# Patient Record
Sex: FEMALE | Race: WHITE | NOT HISPANIC OR LATINO
[De-identification: names, ages, dates, MRNs, and addresses within clinical notes are randomized per-mention and may not be internally consistent; named-entity substitution may affect disease eponyms.]

---

## 2017-01-17 ENCOUNTER — RX RENEWAL (OUTPATIENT)
Age: 42
End: 2017-01-17

## 2017-01-19 ENCOUNTER — APPOINTMENT (OUTPATIENT)
Dept: OTOLARYNGOLOGY | Facility: CLINIC | Age: 42
End: 2017-01-19

## 2017-01-19 VITALS
WEIGHT: 130 LBS | DIASTOLIC BLOOD PRESSURE: 81 MMHG | HEIGHT: 60 IN | BODY MASS INDEX: 25.52 KG/M2 | HEART RATE: 81 BPM | SYSTOLIC BLOOD PRESSURE: 120 MMHG

## 2017-01-19 DIAGNOSIS — J34.3 HYPERTROPHY OF NASAL TURBINATES: ICD-10-CM

## 2017-01-26 ENCOUNTER — APPOINTMENT (OUTPATIENT)
Dept: NEUROLOGY | Facility: CLINIC | Age: 42
End: 2017-01-26

## 2017-01-26 ENCOUNTER — APPOINTMENT (OUTPATIENT)
Dept: OTOLARYNGOLOGY | Facility: CLINIC | Age: 42
End: 2017-01-26

## 2017-01-26 VITALS
WEIGHT: 130 LBS | DIASTOLIC BLOOD PRESSURE: 76 MMHG | SYSTOLIC BLOOD PRESSURE: 110 MMHG | HEIGHT: 60 IN | HEART RATE: 84 BPM | BODY MASS INDEX: 25.52 KG/M2

## 2017-01-26 VITALS
WEIGHT: 130 LBS | SYSTOLIC BLOOD PRESSURE: 121 MMHG | BODY MASS INDEX: 25.52 KG/M2 | HEIGHT: 60 IN | HEART RATE: 81 BPM | DIASTOLIC BLOOD PRESSURE: 80 MMHG

## 2017-01-26 DIAGNOSIS — H69.83 OTHER SPECIFIED DISORDERS OF EUSTACHIAN TUBE, BILATERAL: ICD-10-CM

## 2017-01-31 ENCOUNTER — APPOINTMENT (OUTPATIENT)
Dept: OTOLARYNGOLOGY | Facility: CLINIC | Age: 42
End: 2017-01-31

## 2017-01-31 VITALS
DIASTOLIC BLOOD PRESSURE: 68 MMHG | BODY MASS INDEX: 25.52 KG/M2 | HEART RATE: 77 BPM | WEIGHT: 130 LBS | SYSTOLIC BLOOD PRESSURE: 108 MMHG | HEIGHT: 60 IN

## 2017-02-07 ENCOUNTER — APPOINTMENT (OUTPATIENT)
Dept: OTOLARYNGOLOGY | Facility: CLINIC | Age: 42
End: 2017-02-07

## 2017-02-07 VITALS
WEIGHT: 130 LBS | HEIGHT: 60 IN | HEART RATE: 76 BPM | DIASTOLIC BLOOD PRESSURE: 79 MMHG | BODY MASS INDEX: 25.52 KG/M2 | SYSTOLIC BLOOD PRESSURE: 114 MMHG

## 2017-02-07 DIAGNOSIS — J34.2 DEVIATED NASAL SEPTUM: ICD-10-CM

## 2017-02-14 ENCOUNTER — LABORATORY RESULT (OUTPATIENT)
Age: 42
End: 2017-02-14

## 2017-02-14 ENCOUNTER — APPOINTMENT (OUTPATIENT)
Dept: OTOLARYNGOLOGY | Facility: CLINIC | Age: 42
End: 2017-02-14

## 2017-02-14 ENCOUNTER — APPOINTMENT (OUTPATIENT)
Dept: RHEUMATOLOGY | Facility: CLINIC | Age: 42
End: 2017-02-14

## 2017-02-14 VITALS
HEART RATE: 88 BPM | WEIGHT: 129 LBS | SYSTOLIC BLOOD PRESSURE: 130 MMHG | DIASTOLIC BLOOD PRESSURE: 75 MMHG | HEIGHT: 60 IN | BODY MASS INDEX: 25.32 KG/M2

## 2017-02-14 VITALS
WEIGHT: 129 LBS | OXYGEN SATURATION: 97 % | HEART RATE: 80 BPM | BODY MASS INDEX: 25.32 KG/M2 | TEMPERATURE: 97.9 F | SYSTOLIC BLOOD PRESSURE: 120 MMHG | DIASTOLIC BLOOD PRESSURE: 77 MMHG | HEIGHT: 60 IN

## 2017-02-14 DIAGNOSIS — H90.5 UNSPECIFIED SENSORINEURAL HEARING LOSS: ICD-10-CM

## 2017-02-14 DIAGNOSIS — H93.13 TINNITUS, BILATERAL: ICD-10-CM

## 2017-02-14 DIAGNOSIS — R76.8 OTHER SPECIFIED ABNORMAL IMMUNOLOGICAL FINDINGS IN SERUM: ICD-10-CM

## 2017-02-14 DIAGNOSIS — H91.91 UNSPECIFIED HEARING LOSS, RIGHT EAR: ICD-10-CM

## 2017-02-21 ENCOUNTER — APPOINTMENT (OUTPATIENT)
Dept: NEUROLOGY | Facility: CLINIC | Age: 42
End: 2017-02-21

## 2017-02-21 VITALS
HEART RATE: 77 BPM | SYSTOLIC BLOOD PRESSURE: 134 MMHG | WEIGHT: 130 LBS | BODY MASS INDEX: 24.55 KG/M2 | DIASTOLIC BLOOD PRESSURE: 87 MMHG | HEIGHT: 61 IN

## 2017-02-22 LAB
25(OH)D3 SERPL-MCNC: 14.6 NG/ML
ALBUMIN SERPL ELPH-MCNC: 4.9 G/DL
ALP BLD-CCNC: 75 U/L
ALT SERPL-CCNC: 89 U/L
ANA SER IF-ACNC: NEGATIVE
ANION GAP SERPL CALC-SCNC: 18 MMOL/L
APPEARANCE: ABNORMAL
APTT BLD: 26.3 SEC
AST SERPL-CCNC: 122 U/L
B2 GLYCOPROT1 AB SER QL: NEGATIVE
BACTERIA: ABNORMAL
BASOPHILS # BLD AUTO: 0.04 K/UL
BASOPHILS NFR BLD AUTO: 0.6 %
BILIRUB SERPL-MCNC: 0.4 MG/DL
BILIRUBIN URINE: NEGATIVE
BLOOD URINE: NEGATIVE
BUN SERPL-MCNC: 7 MG/DL
CALCIUM SERPL-MCNC: 9.3 MG/DL
CARDIOLIPIN AB SER IA-ACNC: NEGATIVE
CCP AB SER IA-ACNC: <8 UNITS
CENTROMERE IGG SER-ACNC: <0.2 AL
CHLORIDE SERPL-SCNC: 100 MMOL/L
CO2 SERPL-SCNC: 24 MMOL/L
COLOR: ABNORMAL
CREAT SERPL-MCNC: 0.69 MG/DL
CREAT SPEC-SCNC: 301 MG/DL
CREAT/PROT UR: 0.1 RATIO
CRP SERPL-MCNC: 0.24 MG/DL
DSDNA AB SER-ACNC: <12 IU/ML
ENA RNP AB SER IA-ACNC: <0.2 AL
ENA SCL70 IGG SER IA-ACNC: <0.2 AL
ENA SM AB SER IA-ACNC: <0.2 AL
ENA SS-A AB SER IA-ACNC: <0.2 AL
ENA SS-B AB SER IA-ACNC: <0.2 AL
EOSINOPHIL # BLD AUTO: 0.07 K/UL
EOSINOPHIL NFR BLD AUTO: 1.1 %
ERYTHROCYTE [SEDIMENTATION RATE] IN BLOOD BY WESTERGREN METHOD: 11 MM/HR
FOLATE SERPL-MCNC: 8.3 NG/ML
G6PD SER-CCNC: 7.7 U/G HB
GGT SERPL-CCNC: 392 U/L
GLUCOSE QUALITATIVE U: NORMAL MG/DL
GLUCOSE SERPL-MCNC: 92 MG/DL
HCT VFR BLD CALC: 36.9 %
HGB BLD-MCNC: 12.5 G/DL
HYALINE CASTS: 0 /LPF
IMM GRANULOCYTES NFR BLD AUTO: 0.2 %
KETONES URINE: NEGATIVE
LEUKOCYTE ESTERASE URINE: NEGATIVE
LYMPHOCYTES # BLD AUTO: 1.82 K/UL
LYMPHOCYTES NFR BLD AUTO: 28.1 %
MAN DIFF?: NORMAL
MCHC RBC-ENTMCNC: 33.7 PG
MCHC RBC-ENTMCNC: 33.9 GM/DL
MCV RBC AUTO: 99.5 FL
MICROSCOPIC-UA: NORMAL
MONOCYTES # BLD AUTO: 0.68 K/UL
MONOCYTES NFR BLD AUTO: 10.5 %
MPO AB + PR3 PNL SER: NORMAL
NEUTROPHILS # BLD AUTO: 3.86 K/UL
NEUTROPHILS NFR BLD AUTO: 59.5 %
NITRITE URINE: NEGATIVE
PH URINE: 5.5
PLATELET # BLD AUTO: 207 K/UL
POTASSIUM SERPL-SCNC: 4.1 MMOL/L
PROT SERPL-MCNC: 7.6 G/DL
PROT UR-MCNC: 29 MG/DL
PROTEIN URINE: ABNORMAL MG/DL
RBC # BLD: 3.71 M/UL
RBC # FLD: 14.3 %
RED BLOOD CELLS URINE: 7 /HPF
RF+CCP IGG SER-IMP: NEGATIVE
RHEUMATOID FACT SER QL: <7 IU/ML
RNA POLYMERASE III IGG: <10 U
SODIUM SERPL-SCNC: 142 MMOL/L
SPECIFIC GRAVITY URINE: 1.02
SQUAMOUS EPITHELIAL CELLS: 9 /HPF
THYROGLOB AB SERPL-ACNC: <20 IU/ML
THYROPEROXIDASE AB SERPL IA-ACNC: <10 IU/ML
TSH SERPL-ACNC: 0.82 UIU/ML
URATE SERPL-MCNC: 5.4 MG/DL
URINE COMMENTS: NORMAL
UROBILINOGEN URINE: NORMAL MG/DL
VIT B12 SERPL-MCNC: 1290 PG/ML
VIT C SERPL-MCNC: 0.9 MG/DL
WBC # FLD AUTO: 6.48 K/UL
WHITE BLOOD CELLS URINE: 2 /HPF

## 2017-02-28 ENCOUNTER — APPOINTMENT (OUTPATIENT)
Dept: NEUROLOGY | Facility: CLINIC | Age: 42
End: 2017-02-28

## 2017-02-28 VITALS
HEIGHT: 61 IN | WEIGHT: 130 LBS | SYSTOLIC BLOOD PRESSURE: 106 MMHG | DIASTOLIC BLOOD PRESSURE: 74 MMHG | BODY MASS INDEX: 24.55 KG/M2 | HEART RATE: 86 BPM

## 2017-02-28 LAB — AQP4 H2O CHANNEL AB SERPL IA-ACNC: NEGATIVE

## 2017-02-28 RX ORDER — HYDROCORTISONE AND ACETIC ACID OTIC 20.75; 10.375 MG/ML; MG/ML
1-2 SOLUTION AURICULAR (OTIC)
Qty: 1 | Refills: 0 | Status: DISCONTINUED | COMMUNITY
Start: 2017-01-31 | End: 2017-02-28

## 2017-03-02 ENCOUNTER — APPOINTMENT (OUTPATIENT)
Dept: RHEUMATOLOGY | Facility: CLINIC | Age: 42
End: 2017-03-02

## 2017-03-06 ENCOUNTER — APPOINTMENT (OUTPATIENT)
Dept: OTOLARYNGOLOGY | Facility: CLINIC | Age: 42
End: 2017-03-06

## 2017-03-07 ENCOUNTER — APPOINTMENT (OUTPATIENT)
Dept: DERMATOLOGY | Facility: CLINIC | Age: 42
End: 2017-03-07

## 2017-03-28 ENCOUNTER — APPOINTMENT (OUTPATIENT)
Dept: NEUROLOGY | Facility: CLINIC | Age: 42
End: 2017-03-28

## 2017-03-28 ENCOUNTER — RX RENEWAL (OUTPATIENT)
Age: 42
End: 2017-03-28

## 2017-04-25 ENCOUNTER — CLINICAL ADVICE (OUTPATIENT)
Age: 42
End: 2017-04-25

## 2017-05-02 ENCOUNTER — APPOINTMENT (OUTPATIENT)
Dept: ULTRASOUND IMAGING | Facility: CLINIC | Age: 42
End: 2017-05-02

## 2017-05-02 ENCOUNTER — APPOINTMENT (OUTPATIENT)
Dept: DERMATOLOGY | Facility: CLINIC | Age: 42
End: 2017-05-02

## 2017-05-05 ENCOUNTER — APPOINTMENT (OUTPATIENT)
Dept: ULTRASOUND IMAGING | Facility: CLINIC | Age: 42
End: 2017-05-05

## 2017-05-23 ENCOUNTER — APPOINTMENT (OUTPATIENT)
Dept: INTERNAL MEDICINE | Facility: CLINIC | Age: 42
End: 2017-05-23

## 2017-05-24 ENCOUNTER — APPOINTMENT (OUTPATIENT)
Dept: ULTRASOUND IMAGING | Facility: CLINIC | Age: 42
End: 2017-05-24

## 2017-05-24 ENCOUNTER — OUTPATIENT (OUTPATIENT)
Dept: OUTPATIENT SERVICES | Facility: HOSPITAL | Age: 42
LOS: 1 days | End: 2017-05-24
Payer: MEDICAID

## 2017-05-24 DIAGNOSIS — Z98.89 OTHER SPECIFIED POSTPROCEDURAL STATES: Chronic | ICD-10-CM

## 2017-05-24 DIAGNOSIS — R74.0 NONSPECIFIC ELEVATION OF LEVELS OF TRANSAMINASE AND LACTIC ACID DEHYDROGENASE [LDH]: ICD-10-CM

## 2017-05-24 PROCEDURE — 76705 ECHO EXAM OF ABDOMEN: CPT

## 2017-05-26 ENCOUNTER — APPOINTMENT (OUTPATIENT)
Dept: DERMATOLOGY | Facility: CLINIC | Age: 42
End: 2017-05-26

## 2017-05-26 VITALS — SYSTOLIC BLOOD PRESSURE: 108 MMHG | DIASTOLIC BLOOD PRESSURE: 80 MMHG

## 2017-05-26 DIAGNOSIS — H91.90 UNSPECIFIED HEARING LOSS, UNSPECIFIED EAR: ICD-10-CM

## 2017-05-26 DIAGNOSIS — Z86.39 PERSONAL HISTORY OF OTHER ENDOCRINE, NUTRITIONAL AND METABOLIC DISEASE: ICD-10-CM

## 2017-05-26 LAB
ADJUSTED MITOGEN: 1.02 IU/ML
ADJUSTED TB AG: 0 IU/ML
ALBUMIN SERPL ELPH-MCNC: 4.8 G/DL
ALP BLD-CCNC: 79 U/L
ALT SERPL-CCNC: 101 U/L
ANION GAP SERPL CALC-SCNC: 24 MMOL/L
AST SERPL-CCNC: 275 U/L
BASOPHILS # BLD AUTO: 0.07 K/UL
BASOPHILS NFR BLD AUTO: 1 %
BILIRUB SERPL-MCNC: 0.8 MG/DL
BUN SERPL-MCNC: 5 MG/DL
CALCIUM SERPL-MCNC: 9.4 MG/DL
CHLORIDE SERPL-SCNC: 96 MMOL/L
CO2 SERPL-SCNC: 18 MMOL/L
CREAT SERPL-MCNC: 0.62 MG/DL
EOSINOPHIL # BLD AUTO: 0.07 K/UL
EOSINOPHIL NFR BLD AUTO: 1 %
GLUCOSE SERPL-MCNC: 87 MG/DL
HCT VFR BLD CALC: 36.4 %
HGB BLD-MCNC: 11.9 G/DL
IMM GRANULOCYTES NFR BLD AUTO: 0.3 %
LYMPHOCYTES # BLD AUTO: 2.01 K/UL
LYMPHOCYTES NFR BLD AUTO: 27.6 %
M TB IFN-G BLD-IMP: NEGATIVE
MAN DIFF?: NORMAL
MCHC RBC-ENTMCNC: 32.6 PG
MCHC RBC-ENTMCNC: 32.7 GM/DL
MCV RBC AUTO: 99.7 FL
MONOCYTES # BLD AUTO: 0.69 K/UL
MONOCYTES NFR BLD AUTO: 9.5 %
NEUTROPHILS # BLD AUTO: 4.43 K/UL
NEUTROPHILS NFR BLD AUTO: 60.6 %
PLATELET # BLD AUTO: 285 K/UL
POTASSIUM SERPL-SCNC: 4.4 MMOL/L
PROT SERPL-MCNC: 8 G/DL
QUANTIFERON GOLD NIL: 0.02 IU/ML
RBC # BLD: 3.65 M/UL
RBC # FLD: 13.1 %
SODIUM SERPL-SCNC: 138 MMOL/L
WBC # FLD AUTO: 7.29 K/UL

## 2017-06-13 ENCOUNTER — APPOINTMENT (OUTPATIENT)
Dept: DERMATOLOGY | Facility: CLINIC | Age: 42
End: 2017-06-13
Payer: MEDICAID

## 2017-06-13 VITALS — DIASTOLIC BLOOD PRESSURE: 80 MMHG | SYSTOLIC BLOOD PRESSURE: 116 MMHG

## 2017-06-13 PROCEDURE — 99213 OFFICE O/P EST LOW 20 MIN: CPT

## 2017-06-27 ENCOUNTER — APPOINTMENT (OUTPATIENT)
Dept: INTERNAL MEDICINE | Facility: CLINIC | Age: 42
End: 2017-06-27

## 2017-06-27 ENCOUNTER — NON-APPOINTMENT (OUTPATIENT)
Age: 42
End: 2017-06-27

## 2017-06-27 VITALS — DIASTOLIC BLOOD PRESSURE: 80 MMHG | SYSTOLIC BLOOD PRESSURE: 120 MMHG

## 2017-06-27 VITALS — HEIGHT: 55 IN | WEIGHT: 124 LBS | BODY MASS INDEX: 28.7 KG/M2

## 2017-06-27 DIAGNOSIS — H65.93 UNSPECIFIED NONSUPPURATIVE OTITIS MEDIA, BILATERAL: ICD-10-CM

## 2017-06-27 DIAGNOSIS — Z51.81 ENCOUNTER FOR THERAPEUTIC DRUG LVL MONITORING: ICD-10-CM

## 2017-06-27 DIAGNOSIS — Z87.898 PERSONAL HISTORY OF OTHER SPECIFIED CONDITIONS: ICD-10-CM

## 2017-06-27 DIAGNOSIS — H92.03 OTALGIA, BILATERAL: ICD-10-CM

## 2017-06-27 DIAGNOSIS — R20.0 ANESTHESIA OF SKIN: ICD-10-CM

## 2017-06-27 DIAGNOSIS — R29.898 OTHER SYMPTOMS AND SIGNS INVOLVING THE MUSCULOSKELETAL SYSTEM: ICD-10-CM

## 2017-06-27 DIAGNOSIS — R74.0 NONSPECIFIC ELEVATION OF LEVELS OF TRANSAMINASE AND LACTIC ACID DEHYDROGENASE [LDH]: ICD-10-CM

## 2017-06-27 DIAGNOSIS — R04.0 EPISTAXIS: ICD-10-CM

## 2017-06-27 DIAGNOSIS — Z00.00 ENCOUNTER FOR GENERAL ADULT MEDICAL EXAMINATION W/OUT ABNORMAL FINDINGS: ICD-10-CM

## 2017-06-27 RX ORDER — CLONAZEPAM 0.5 MG/1
0.5 TABLET ORAL DAILY
Qty: 30 | Refills: 3 | Status: DISCONTINUED | COMMUNITY
Start: 2017-02-28 | End: 2017-06-27

## 2017-06-28 ENCOUNTER — RESULT REVIEW (OUTPATIENT)
Age: 42
End: 2017-06-28

## 2017-06-28 LAB
25(OH)D3 SERPL-MCNC: 42.4 NG/ML
APPEARANCE: ABNORMAL
BACTERIA: ABNORMAL
BILIRUBIN URINE: NEGATIVE
BLOOD URINE: NEGATIVE
COLOR: ABNORMAL
GLUCOSE QUALITATIVE U: NORMAL MG/DL
HBA1C MFR BLD HPLC: 4.8 %
HIV1+2 AB SPEC QL IA.RAPID: NONREACTIVE
HYALINE CASTS: 0 /LPF
KETONES URINE: ABNORMAL
LEUKOCYTE ESTERASE URINE: NEGATIVE
MICROSCOPIC-UA: NORMAL
NITRITE URINE: NEGATIVE
PH URINE: 6.5
PROTEIN URINE: ABNORMAL MG/DL
RED BLOOD CELLS URINE: 3 /HPF
SPECIFIC GRAVITY URINE: 1.02
SQUAMOUS EPITHELIAL CELLS: 5 /HPF
TSH SERPL-ACNC: 1.61 UIU/ML
UROBILINOGEN URINE: 1 MG/DL
WHITE BLOOD CELLS URINE: 3 /HPF

## 2017-07-11 ENCOUNTER — RX RENEWAL (OUTPATIENT)
Age: 42
End: 2017-07-11

## 2017-07-11 DIAGNOSIS — R23.8 OTHER SKIN CHANGES: ICD-10-CM

## 2017-07-11 LAB
ALBUMIN SERPL ELPH-MCNC: 5 G/DL
ALP BLD-CCNC: 81 U/L
ALT SERPL-CCNC: 72 U/L
ANION GAP SERPL CALC-SCNC: 21 MMOL/L
AST SERPL-CCNC: 174 U/L
BASOPHILS # BLD AUTO: 0.05 K/UL
BASOPHILS NFR BLD AUTO: 1.3 %
BILIRUB SERPL-MCNC: 0.6 MG/DL
BUN SERPL-MCNC: 4 MG/DL
CALCIUM SERPL-MCNC: 9.9 MG/DL
CHLORIDE SERPL-SCNC: 97 MMOL/L
CHOLEST SERPL-MCNC: 198 MG/DL
CHOLEST/HDLC SERPL: 1.7 RATIO
CO2 SERPL-SCNC: 22 MMOL/L
CREAT SERPL-MCNC: 0.71 MG/DL
EOSINOPHIL # BLD AUTO: 0.08 K/UL
EOSINOPHIL NFR BLD AUTO: 2.1 %
GLUCOSE SERPL-MCNC: 105 MG/DL
HCT VFR BLD CALC: 34.4 %
HDLC SERPL-MCNC: 116 MG/DL
HGB BLD-MCNC: 11 G/DL
IMM GRANULOCYTES NFR BLD AUTO: 0.3 %
IRON SATN MFR SERPL: 15 %
IRON SERPL-MCNC: 68 UG/DL
LDLC SERPL CALC-MCNC: 67 MG/DL
LYMPHOCYTES # BLD AUTO: 1.21 K/UL
LYMPHOCYTES NFR BLD AUTO: 31.6 %
MAN DIFF?: NORMAL
MCHC RBC-ENTMCNC: 31.4 PG
MCHC RBC-ENTMCNC: 32 GM/DL
MCV RBC AUTO: 98.3 FL
MONOCYTES # BLD AUTO: 0.56 K/UL
MONOCYTES NFR BLD AUTO: 14.6 %
NEUTROPHILS # BLD AUTO: 1.92 K/UL
NEUTROPHILS NFR BLD AUTO: 50.1 %
PLATELET # BLD AUTO: 237 K/UL
POTASSIUM SERPL-SCNC: 4.3 MMOL/L
PROT SERPL-MCNC: 7.8 G/DL
RBC # BLD: 3.5 M/UL
RBC # FLD: 13 %
SODIUM SERPL-SCNC: 140 MMOL/L
TIBC SERPL-MCNC: 455 UG/DL
TRIGL SERPL-MCNC: 75 MG/DL
UIBC SERPL-MCNC: 387 UG/DL
WBC # FLD AUTO: 3.83 K/UL

## 2017-07-19 ENCOUNTER — APPOINTMENT (OUTPATIENT)
Dept: INTERNAL MEDICINE | Facility: CLINIC | Age: 42
End: 2017-07-19

## 2017-07-19 DIAGNOSIS — Z23 ENCOUNTER FOR IMMUNIZATION: ICD-10-CM

## 2017-07-21 ENCOUNTER — OUTPATIENT (OUTPATIENT)
Dept: OUTPATIENT SERVICES | Facility: HOSPITAL | Age: 42
LOS: 1 days | Discharge: STILL ADMITTED OR ADMINISTRATIVE | End: 2017-07-21
Payer: MEDICAID

## 2017-07-21 DIAGNOSIS — Z98.89 OTHER SPECIFIED POSTPROCEDURAL STATES: Chronic | ICD-10-CM

## 2017-07-21 PROCEDURE — 90792 PSYCH DIAG EVAL W/MED SRVCS: CPT

## 2017-07-24 DIAGNOSIS — F43.23 ADJUSTMENT DISORDER WITH MIXED ANXIETY AND DEPRESSED MOOD: ICD-10-CM

## 2017-07-27 ENCOUNTER — LABORATORY RESULT (OUTPATIENT)
Age: 42
End: 2017-07-27

## 2017-07-27 ENCOUNTER — APPOINTMENT (OUTPATIENT)
Age: 42
End: 2017-07-27
Payer: MEDICAID

## 2017-07-27 VITALS
SYSTOLIC BLOOD PRESSURE: 119 MMHG | DIASTOLIC BLOOD PRESSURE: 79 MMHG | TEMPERATURE: 98.7 F | RESPIRATION RATE: 12 BRPM | WEIGHT: 128 LBS | HEART RATE: 82 BPM | BODY MASS INDEX: 23.55 KG/M2 | HEIGHT: 62 IN

## 2017-07-27 DIAGNOSIS — K76.0 FATTY (CHANGE OF) LIVER, NOT ELSEWHERE CLASSIFIED: ICD-10-CM

## 2017-07-27 PROCEDURE — 99204 OFFICE O/P NEW MOD 45 MIN: CPT

## 2017-07-28 LAB
ALBUMIN SERPL ELPH-MCNC: 4.7 G/DL
ALP BLD-CCNC: 76 U/L
ALT SERPL-CCNC: 86 U/L
ANION GAP SERPL CALC-SCNC: 20 MMOL/L
AST SERPL-CCNC: 225 U/L
BASOPHILS # BLD AUTO: 0.04 K/UL
BASOPHILS NFR BLD AUTO: 0.5 %
BILIRUB SERPL-MCNC: 0.5 MG/DL
BUN SERPL-MCNC: 7 MG/DL
CALCIUM SERPL-MCNC: 10.3 MG/DL
CERULOPLASMIN SERPL-MCNC: 37 MG/DL
CHLORIDE SERPL-SCNC: 96 MMOL/L
CO2 SERPL-SCNC: 22 MMOL/L
CREAT SERPL-MCNC: 0.84 MG/DL
DEPRECATED KAPPA LC FREE/LAMBDA SER: 0.78 RATIO
EOSINOPHIL # BLD AUTO: 0.08 K/UL
EOSINOPHIL NFR BLD AUTO: 1 %
GLUCOSE SERPL-MCNC: 91 MG/DL
HCT VFR BLD CALC: 34.2 %
HGB BLD-MCNC: 10.9 G/DL
IGA SER QL IEP: 290 MG/DL
IGG SER QL IEP: 954 MG/DL
IGM SER QL IEP: 175 MG/DL
IMM GRANULOCYTES NFR BLD AUTO: 0.2 %
KAPPA LC CSF-MCNC: 1.95 MG/DL
KAPPA LC SERPL-MCNC: 1.53 MG/DL
LYMPHOCYTES # BLD AUTO: 2.14 K/UL
LYMPHOCYTES NFR BLD AUTO: 26.3 %
MAN DIFF?: NORMAL
MCHC RBC-ENTMCNC: 29.3 PG
MCHC RBC-ENTMCNC: 31.9 GM/DL
MCV RBC AUTO: 91.9 FL
MONOCYTES # BLD AUTO: 0.9 K/UL
MONOCYTES NFR BLD AUTO: 11.1 %
MPO AB + PR3 PNL SER: NORMAL
NEUTROPHILS # BLD AUTO: 4.95 K/UL
NEUTROPHILS NFR BLD AUTO: 60.9 %
PLATELET # BLD AUTO: 299 K/UL
POTASSIUM SERPL-SCNC: 3.9 MMOL/L
PROT SERPL-MCNC: 8.3 G/DL
RBC # BLD: 3.72 M/UL
RBC # FLD: 14.1 %
SODIUM SERPL-SCNC: 138 MMOL/L
TTG IGA SER IA-ACNC: 6.9 UNITS
TTG IGA SER-ACNC: NEGATIVE
WBC # FLD AUTO: 8.13 K/UL

## 2017-07-29 LAB
CARDIOLIPIN AB SER IA-ACNC: POSITIVE
MITOCHONDRIA AB SER IF-ACNC: NORMAL
SMOOTH MUSCLE AB SER QL IF: NORMAL

## 2017-07-31 ENCOUNTER — RX RENEWAL (OUTPATIENT)
Age: 42
End: 2017-07-31

## 2017-07-31 LAB
ANA PAT FLD IF-IMP: ABNORMAL
ANA PATTERN: ABNORMAL
ANA SER IF-ACNC: ABNORMAL
ANA TITER: ABNORMAL
HBV DNA # SERPL NAA+PROBE: NOT DETECTED
HEPB DNA PCR LOG: NOT DETECTED LOGIU/ML

## 2017-08-04 LAB — HEPATITIS E IGM ABY: NORMAL

## 2017-08-10 ENCOUNTER — APPOINTMENT (OUTPATIENT)
Dept: INTERNAL MEDICINE | Facility: CLINIC | Age: 42
End: 2017-08-10
Payer: MEDICAID

## 2017-08-10 LAB
MISCELLANEOUS TEST: NORMAL
PROC NAME: NORMAL

## 2017-08-10 PROCEDURE — 90471 IMMUNIZATION ADMIN: CPT

## 2017-08-10 PROCEDURE — 90636 HEP A/HEP B VACC ADULT IM: CPT

## 2017-08-14 ENCOUNTER — APPOINTMENT (OUTPATIENT)
Age: 42
End: 2017-08-14
Payer: MEDICAID

## 2017-08-14 LAB
INR PPP: 0.97 RATIO
PT BLD: 11 SEC

## 2017-08-14 PROCEDURE — 91200 LIVER ELASTOGRAPHY: CPT

## 2017-08-16 LAB
ALBUMIN SERPL ELPH-MCNC: 4.9 G/DL
ALP BLD-CCNC: 80 U/L
ALT SERPL-CCNC: 105 U/L
APTT IMM NP/PRE NP PPP: NORMAL
APTT INV RATIO PPP: 27.7 SEC
AST SERPL-CCNC: 263 U/L
BASOPHILS # BLD AUTO: 0.08 K/UL
BASOPHILS NFR BLD AUTO: 2 %
BILIRUB DIRECT SERPL-MCNC: 0.2 MG/DL
BILIRUB INDIRECT SERPL-MCNC: 0.4 MG/DL
BILIRUB SERPL-MCNC: 0.6 MG/DL
DEPRECATED KAPPA LC FREE/LAMBDA SER: 0.91 RATIO
EOSINOPHIL # BLD AUTO: 0.16 K/UL
EOSINOPHIL NFR BLD AUTO: 4 %
HCT VFR BLD CALC: 34.5 %
HGB BLD-MCNC: 10.8 G/DL
IGA SER QL IEP: 308 MG/DL
IGG SER QL IEP: 1110 MG/DL
IGM SER QL IEP: 181 MG/DL
IMM GRANULOCYTES NFR BLD AUTO: 0 %
KAPPA LC CSF-MCNC: 2.1 MG/DL
KAPPA LC SERPL-MCNC: 1.91 MG/DL
LYMPHOCYTES # BLD AUTO: 1.61 K/UL
LYMPHOCYTES NFR BLD AUTO: 40.3 %
MAN DIFF?: NORMAL
MCHC RBC-ENTMCNC: 28.5 PG
MCHC RBC-ENTMCNC: 31.3 GM/DL
MCV RBC AUTO: 91 FL
MONOCYTES # BLD AUTO: 0.55 K/UL
MONOCYTES NFR BLD AUTO: 13.8 %
NEUTROPHILS # BLD AUTO: 1.6 K/UL
NEUTROPHILS NFR BLD AUTO: 39.9 %
NPP NORMAL POOLED PLASMA: NORMAL
PLATELET # BLD AUTO: 248 K/UL
PROT SERPL-MCNC: 8.3 G/DL
RBC # BLD: 3.79 M/UL
RBC # FLD: 15.3 %
WBC # FLD AUTO: 4 K/UL

## 2017-09-01 ENCOUNTER — APPOINTMENT (OUTPATIENT)
Age: 42
End: 2017-09-01

## 2017-09-03 ENCOUNTER — TRANSCRIPTION ENCOUNTER (OUTPATIENT)
Age: 42
End: 2017-09-03

## 2017-09-29 ENCOUNTER — RX RENEWAL (OUTPATIENT)
Age: 42
End: 2017-09-29

## 2017-10-05 ENCOUNTER — OTHER (OUTPATIENT)
Age: 42
End: 2017-10-05

## 2017-10-06 ENCOUNTER — RX RENEWAL (OUTPATIENT)
Age: 42
End: 2017-10-06

## 2017-11-13 ENCOUNTER — APPOINTMENT (OUTPATIENT)
Dept: INTERNAL MEDICINE | Facility: CLINIC | Age: 42
End: 2017-11-13
Payer: MEDICAID

## 2017-11-13 PROCEDURE — 90746 HEPB VACCINE 3 DOSE ADULT IM: CPT

## 2017-11-13 PROCEDURE — 90472 IMMUNIZATION ADMIN EACH ADD: CPT

## 2017-11-13 PROCEDURE — 90686 IIV4 VACC NO PRSV 0.5 ML IM: CPT

## 2017-11-13 PROCEDURE — G0008: CPT

## 2017-11-13 PROCEDURE — 99214 OFFICE O/P EST MOD 30 MIN: CPT | Mod: 25

## 2017-11-14 VITALS — SYSTOLIC BLOOD PRESSURE: 120 MMHG | DIASTOLIC BLOOD PRESSURE: 80 MMHG

## 2017-11-17 ENCOUNTER — RESULT REVIEW (OUTPATIENT)
Age: 42
End: 2017-11-17

## 2017-11-17 ENCOUNTER — APPOINTMENT (OUTPATIENT)
Dept: DERMATOLOGY | Facility: CLINIC | Age: 42
End: 2017-11-17
Payer: MEDICAID

## 2017-11-17 VITALS — SYSTOLIC BLOOD PRESSURE: 130 MMHG | DIASTOLIC BLOOD PRESSURE: 84 MMHG

## 2017-11-17 PROCEDURE — 99214 OFFICE O/P EST MOD 30 MIN: CPT

## 2017-11-20 ENCOUNTER — TRANSCRIPTION ENCOUNTER (OUTPATIENT)
Age: 42
End: 2017-11-20

## 2017-11-20 ENCOUNTER — RESULT REVIEW (OUTPATIENT)
Age: 42
End: 2017-11-20

## 2017-11-20 LAB
ALBUMIN SERPL ELPH-MCNC: 4.3 G/DL
ALP BLD-CCNC: 152 U/L
ALT SERPL-CCNC: 533 U/L
ANION GAP SERPL CALC-SCNC: 16 MMOL/L
AST SERPL-CCNC: 963 U/L
BASOPHILS # BLD AUTO: 0.03 K/UL
BASOPHILS NFR BLD AUTO: 0.7 %
BILIRUB SERPL-MCNC: 0.6 MG/DL
BUN SERPL-MCNC: 7 MG/DL
CALCIUM SERPL-MCNC: 8.7 MG/DL
CHLORIDE SERPL-SCNC: 96 MMOL/L
CO2 SERPL-SCNC: 25 MMOL/L
CREAT SERPL-MCNC: 0.69 MG/DL
EOSINOPHIL # BLD AUTO: 0 K/UL
EOSINOPHIL NFR BLD AUTO: 0 %
GLUCOSE SERPL-MCNC: 101 MG/DL
HCT VFR BLD CALC: 35.3 %
HGB BLD-MCNC: 11.3 G/DL
IMM GRANULOCYTES NFR BLD AUTO: 0.2 %
LYMPHOCYTES # BLD AUTO: 1.29 K/UL
LYMPHOCYTES NFR BLD AUTO: 29.1 %
MAN DIFF?: NORMAL
MCHC RBC-ENTMCNC: 27 PG
MCHC RBC-ENTMCNC: 32 GM/DL
MCV RBC AUTO: 84.2 FL
MONOCYTES # BLD AUTO: 0.71 K/UL
MONOCYTES NFR BLD AUTO: 16 %
NEUTROPHILS # BLD AUTO: 2.4 K/UL
NEUTROPHILS NFR BLD AUTO: 54 %
PLATELET # BLD AUTO: 269 K/UL
POTASSIUM SERPL-SCNC: 4 MMOL/L
PROT SERPL-MCNC: 8.8 G/DL
RBC # BLD: 4.19 M/UL
RBC # FLD: 16.4 %
SODIUM SERPL-SCNC: 137 MMOL/L
WBC # FLD AUTO: 4.44 K/UL

## 2017-11-22 ENCOUNTER — APPOINTMENT (OUTPATIENT)
Age: 42
End: 2017-11-22
Payer: MEDICAID

## 2017-11-22 VITALS
SYSTOLIC BLOOD PRESSURE: 127 MMHG | HEIGHT: 62 IN | BODY MASS INDEX: 23.19 KG/M2 | RESPIRATION RATE: 17 BRPM | DIASTOLIC BLOOD PRESSURE: 87 MMHG | TEMPERATURE: 98.3 F | WEIGHT: 126 LBS | HEART RATE: 88 BPM

## 2017-11-22 LAB
BASOPHILS # BLD AUTO: 0.04 K/UL
BASOPHILS NFR BLD AUTO: 0.8 %
EOSINOPHIL # BLD AUTO: 0 K/UL
EOSINOPHIL NFR BLD AUTO: 0 %
FERRITIN SERPL-MCNC: 175 NG/ML
HCT VFR BLD CALC: 36.9 %
HGB BLD-MCNC: 11.9 G/DL
IMM GRANULOCYTES NFR BLD AUTO: 0 %
LYMPHOCYTES # BLD AUTO: 1.47 K/UL
LYMPHOCYTES NFR BLD AUTO: 30.4 %
MAN DIFF?: NORMAL
MCHC RBC-ENTMCNC: 27.4 PG
MCHC RBC-ENTMCNC: 32.2 GM/DL
MCV RBC AUTO: 85 FL
MONOCYTES # BLD AUTO: 0.69 K/UL
MONOCYTES NFR BLD AUTO: 14.3 %
NEUTROPHILS # BLD AUTO: 2.64 K/UL
NEUTROPHILS NFR BLD AUTO: 54.5 %
PLATELET # BLD AUTO: 275 K/UL
RBC # BLD: 4.34 M/UL
RBC # FLD: 16.7 %
WBC # FLD AUTO: 4.84 K/UL

## 2017-11-22 PROCEDURE — 99213 OFFICE O/P EST LOW 20 MIN: CPT

## 2017-11-22 RX ORDER — AMOXICILLIN AND CLAVULANATE POTASSIUM 875; 125 MG/1; MG/1
875-125 TABLET, COATED ORAL
Qty: 20 | Refills: 0 | Status: DISCONTINUED | COMMUNITY
Start: 2017-11-13 | End: 2017-11-22

## 2017-11-27 ENCOUNTER — APPOINTMENT (OUTPATIENT)
Dept: INTERNAL MEDICINE | Facility: CLINIC | Age: 42
End: 2017-11-27
Payer: MEDICAID

## 2017-11-27 ENCOUNTER — LABORATORY RESULT (OUTPATIENT)
Age: 42
End: 2017-11-27

## 2017-11-27 LAB
AFP-TM SERPL-MCNC: 4.4 NG/ML
ALBUMIN SERPL ELPH-MCNC: 4.4 G/DL
ALP BLD-CCNC: 143 U/L
ALT SERPL-CCNC: 417 U/L
ANA PAT FLD IF-IMP: ABNORMAL
ANA SER IF-ACNC: ABNORMAL
ANION GAP SERPL CALC-SCNC: 16 MMOL/L
AST SERPL-CCNC: 569 U/L
BILIRUB SERPL-MCNC: 0.9 MG/DL
BUN SERPL-MCNC: 6 MG/DL
CALCIUM SERPL-MCNC: 10 MG/DL
CHLORIDE SERPL-SCNC: 94 MMOL/L
CMV DNA SPEC QL NAA+PROBE: NOT DETECTED
CMV IGG SERPL QL: <0.2 U/ML
CMV IGG SERPL-IMP: NEGATIVE
CMV IGM SERPL QL: <8 AU/ML
CMV IGM SERPL QL: NEGATIVE
CO2 SERPL-SCNC: 23 MMOL/L
CREAT SERPL-MCNC: 0.67 MG/DL
DEPRECATED KAPPA LC FREE/LAMBDA SER: 1.61 RATIO
EBV DNA SERPL NAA+PROBE-ACNC: NOT DETECTED
EBV EA AB SER IA-ACNC: 101 U/ML
EBV EA AB TITR SER IF: NEGATIVE
EBV EA IGG SER QL IA: <3 U/ML
EBV EA IGG SER-ACNC: POSITIVE
EBV EA IGM SER IA-ACNC: NEGATIVE
EBV PATRN SPEC IB-IMP: NORMAL
EBV VCA IGG SER IA-ACNC: 128 U/ML
EBV VCA IGM SER QL IA: <10 U/ML
EPSTEIN-BARR VIRUS CAPSID ANTIGEN IGG: POSITIVE
GLUCOSE SERPL-MCNC: 99 MG/DL
HAV IGG+IGM SER QL: REACTIVE
HAV IGM SER QL: NONREACTIVE
HBV CORE IGM SER QL: NONREACTIVE
HBV DNA # SERPL NAA+PROBE: NOT DETECTED IU/ML
HBV E AB SER QL: NEGATIVE
HBV E AG SER QL: NEGATIVE
HBV SURFACE AB SER QL: REACTIVE
HBV SURFACE AG SER QL: NONREACTIVE
HCV AB SER QL: NONREACTIVE
HCV RNA SERPL NAA DL=5-ACNC: NOT DETECTED IU/ML
HCV RNA SERPL NAA+PROBE-LOG IU: NOT DETECTED LOGIU/ML
HCV S/CO RATIO: 0.1 S/CO
HEPATITIS E IGM ABY: NORMAL
HEPB DNA PCR LOG: NOT DETECTED LOGIU/ML
HEV AB SER QL: NEGATIVE
IGA SER QL IEP: 393 MG/DL
IGG SER QL IEP: 2310 MG/DL
IGM SER QL IEP: 243 MG/DL
IRON SATN MFR SERPL: 13 %
IRON SERPL-MCNC: 80 UG/DL
KAPPA LC CSF-MCNC: 3.37 MG/DL
KAPPA LC SERPL-MCNC: 5.41 MG/DL
MITOCHONDRIA AB SER IF-ACNC: NORMAL
POTASSIUM SERPL-SCNC: 3.7 MMOL/L
PROT SERPL-MCNC: 9.4 G/DL
SMOOTH MUSCLE AB SER QL IF: ABNORMAL
SODIUM SERPL-SCNC: 133 MMOL/L
SOLUBLE LIVER IGG SER IA-ACNC: < 20.1 UNITS
TIBC SERPL-MCNC: 596 UG/DL
UIBC SERPL-MCNC: 516 UG/DL

## 2017-11-27 PROCEDURE — 36415 COLL VENOUS BLD VENIPUNCTURE: CPT

## 2017-11-28 LAB
INR PPP: 1.03 RATIO
PT BLD: 11.6 SEC

## 2017-11-29 ENCOUNTER — RX RENEWAL (OUTPATIENT)
Age: 42
End: 2017-11-29

## 2017-11-29 LAB
ALBUMIN SERPL ELPH-MCNC: 3.8 G/DL
ALP BLD-CCNC: 113 U/L
ALT SERPL-CCNC: 447 U/L
ANION GAP SERPL CALC-SCNC: 15 MMOL/L
AST SERPL-CCNC: 463 U/L
BASOPHILS # BLD AUTO: 0.04 K/UL
BASOPHILS NFR BLD AUTO: 0.7 %
BILIRUB SERPL-MCNC: 0.7 MG/DL
BUN SERPL-MCNC: 9 MG/DL
CALCIUM SERPL-MCNC: 9.9 MG/DL
CHLORIDE SERPL-SCNC: 103 MMOL/L
CO2 SERPL-SCNC: 18 MMOL/L
CREAT SERPL-MCNC: 0.85 MG/DL
DEPRECATED KAPPA LC FREE/LAMBDA SER: 1.31 RATIO
EOSINOPHIL # BLD AUTO: 0 K/UL
EOSINOPHIL NFR BLD AUTO: 0
GLUCOSE SERPL-MCNC: 97 MG/DL
HCT VFR BLD CALC: 36.7 %
HGB BLD-MCNC: 11.4 G/DL
IGA SER QL IEP: 369 MG/DL
IGG SER QL IEP: 2180 MG/DL
IGM SER QL IEP: 218 MG/DL
IMM GRANULOCYTES NFR BLD AUTO: 0.2 %
KAPPA LC CSF-MCNC: 3.12 MG/DL
KAPPA LC SERPL-MCNC: 4.1 MG/DL
LYMPHOCYTES # BLD AUTO: 2.06 K/UL
LYMPHOCYTES NFR BLD AUTO: 37.7 %
MAN DIFF?: NORMAL
MCHC RBC-ENTMCNC: 27.9 PG
MCHC RBC-ENTMCNC: 31.1 GM/DL
MCV RBC AUTO: 90 FL
MONOCYTES # BLD AUTO: 0.97 K/UL
MONOCYTES NFR BLD AUTO: 17.8 %
NEUTROPHILS # BLD AUTO: 2.38 K/UL
NEUTROPHILS NFR BLD AUTO: 43.6 %
PLATELET # BLD AUTO: 274 K/UL
POTASSIUM SERPL-SCNC: 4.5 MMOL/L
PROT SERPL-MCNC: 8.7 G/DL
RBC # BLD: 4.08 M/UL
RBC # FLD: 18.5 %
SMOOTH MUSCLE AB SER QL IF: NORMAL
SODIUM SERPL-SCNC: 136 MMOL/L
WBC # FLD AUTO: 5.46 K/UL

## 2017-12-06 ENCOUNTER — APPOINTMENT (OUTPATIENT)
Dept: INTERNAL MEDICINE | Facility: CLINIC | Age: 42
End: 2017-12-06
Payer: MEDICAID

## 2017-12-06 PROCEDURE — 36415 COLL VENOUS BLD VENIPUNCTURE: CPT

## 2017-12-08 ENCOUNTER — RX RENEWAL (OUTPATIENT)
Age: 42
End: 2017-12-08

## 2017-12-08 LAB
ALBUMIN SERPL ELPH-MCNC: 3.7 G/DL
ALP BLD-CCNC: 111 U/L
ALT SERPL-CCNC: 338 U/L
ANION GAP SERPL CALC-SCNC: 11 MMOL/L
AST SERPL-CCNC: 256 U/L
BILIRUB SERPL-MCNC: 0.7 MG/DL
BUN SERPL-MCNC: 8 MG/DL
CALCIUM SERPL-MCNC: 9.4 MG/DL
CHLORIDE SERPL-SCNC: 102 MMOL/L
CO2 SERPL-SCNC: 25 MMOL/L
CREAT SERPL-MCNC: 0.8 MG/DL
GLUCOSE SERPL-MCNC: 81 MG/DL
POTASSIUM SERPL-SCNC: 4.3 MMOL/L
PROT SERPL-MCNC: 8.1 G/DL
SODIUM SERPL-SCNC: 138 MMOL/L

## 2017-12-18 ENCOUNTER — APPOINTMENT (OUTPATIENT)
Dept: INTERNAL MEDICINE | Facility: CLINIC | Age: 42
End: 2017-12-18
Payer: MEDICAID

## 2017-12-18 PROCEDURE — 36415 COLL VENOUS BLD VENIPUNCTURE: CPT

## 2017-12-19 ENCOUNTER — APPOINTMENT (OUTPATIENT)
Dept: MRI IMAGING | Facility: IMAGING CENTER | Age: 42
End: 2017-12-19
Payer: MEDICAID

## 2017-12-19 ENCOUNTER — OUTPATIENT (OUTPATIENT)
Dept: OUTPATIENT SERVICES | Facility: HOSPITAL | Age: 42
LOS: 1 days | End: 2017-12-19
Payer: MEDICAID

## 2017-12-19 DIAGNOSIS — Z98.89 OTHER SPECIFIED POSTPROCEDURAL STATES: Chronic | ICD-10-CM

## 2017-12-19 DIAGNOSIS — R74.8 ABNORMAL LEVELS OF OTHER SERUM ENZYMES: ICD-10-CM

## 2017-12-19 LAB
ALBUMIN SERPL ELPH-MCNC: 4 G/DL
ALP BLD-CCNC: 85 U/L
ALT SERPL-CCNC: 171 U/L
ANION GAP SERPL CALC-SCNC: 20 MMOL/L
AST SERPL-CCNC: 175 U/L
BASOPHILS # BLD AUTO: 0.03 K/UL
BASOPHILS NFR BLD AUTO: 0.4 %
BILIRUB SERPL-MCNC: 0.8 MG/DL
BUN SERPL-MCNC: 13 MG/DL
CALCIUM SERPL-MCNC: 9.7 MG/DL
CHLORIDE SERPL-SCNC: 97 MMOL/L
CO2 SERPL-SCNC: 20 MMOL/L
CREAT SERPL-MCNC: 0.93 MG/DL
DEPRECATED KAPPA LC FREE/LAMBDA SER: 0.97 RATIO
EOSINOPHIL # BLD AUTO: 0.05 K/UL
EOSINOPHIL NFR BLD AUTO: 0.7 %
GLUCOSE SERPL-MCNC: 81 MG/DL
HCT VFR BLD CALC: 35.7 %
HGB BLD-MCNC: 11.2 G/DL
IGA SER QL IEP: 350 MG/DL
IGG SER QL IEP: 2240 MG/DL
IGM SER QL IEP: 218 MG/DL
IMM GRANULOCYTES NFR BLD AUTO: 0.1 %
INR PPP: 0.99 RATIO
KAPPA LC CSF-MCNC: 2.7 MG/DL
KAPPA LC SERPL-MCNC: 2.61 MG/DL
LYMPHOCYTES # BLD AUTO: 2.87 K/UL
LYMPHOCYTES NFR BLD AUTO: 38.4 %
MAN DIFF?: NORMAL
MCHC RBC-ENTMCNC: 27.2 PG
MCHC RBC-ENTMCNC: 31.4 GM/DL
MCV RBC AUTO: 86.7 FL
MONOCYTES # BLD AUTO: 0.75 K/UL
MONOCYTES NFR BLD AUTO: 10 %
NEUTROPHILS # BLD AUTO: 3.77 K/UL
NEUTROPHILS NFR BLD AUTO: 50.4 %
PLATELET # BLD AUTO: 361 K/UL
POTASSIUM SERPL-SCNC: 4 MMOL/L
PROT SERPL-MCNC: 8.7 G/DL
PT BLD: 11.2 SEC
RBC # BLD: 4.12 M/UL
RBC # FLD: 18.1 %
SODIUM SERPL-SCNC: 137 MMOL/L
WBC # FLD AUTO: 7.48 K/UL

## 2017-12-19 PROCEDURE — 74183 MRI ABD W/O CNTR FLWD CNTR: CPT

## 2017-12-19 PROCEDURE — 74183 MRI ABD W/O CNTR FLWD CNTR: CPT | Mod: 26

## 2017-12-19 PROCEDURE — A9585: CPT

## 2017-12-21 ENCOUNTER — OUTPATIENT (OUTPATIENT)
Dept: OUTPATIENT SERVICES | Facility: HOSPITAL | Age: 42
LOS: 1 days | End: 2017-12-21
Payer: MEDICAID

## 2017-12-21 ENCOUNTER — RESULT REVIEW (OUTPATIENT)
Age: 42
End: 2017-12-21

## 2017-12-21 ENCOUNTER — APPOINTMENT (OUTPATIENT)
Dept: ULTRASOUND IMAGING | Facility: IMAGING CENTER | Age: 42
End: 2017-12-21
Payer: MEDICAID

## 2017-12-21 DIAGNOSIS — Z98.89 OTHER SPECIFIED POSTPROCEDURAL STATES: Chronic | ICD-10-CM

## 2017-12-21 DIAGNOSIS — Z00.8 ENCOUNTER FOR OTHER GENERAL EXAMINATION: ICD-10-CM

## 2017-12-21 DIAGNOSIS — R74.8 ABNORMAL LEVELS OF OTHER SERUM ENZYMES: ICD-10-CM

## 2017-12-21 PROCEDURE — 88313 SPECIAL STAINS GROUP 2: CPT | Mod: 26

## 2017-12-21 PROCEDURE — 88313 SPECIAL STAINS GROUP 2: CPT

## 2017-12-21 PROCEDURE — 47000 NEEDLE BIOPSY OF LIVER PERQ: CPT

## 2017-12-21 PROCEDURE — 88307 TISSUE EXAM BY PATHOLOGIST: CPT

## 2017-12-21 PROCEDURE — 76942 ECHO GUIDE FOR BIOPSY: CPT | Mod: 26

## 2017-12-21 PROCEDURE — 88307 TISSUE EXAM BY PATHOLOGIST: CPT | Mod: 26

## 2017-12-21 PROCEDURE — 76942 ECHO GUIDE FOR BIOPSY: CPT

## 2017-12-22 LAB — SURGICAL PATHOLOGY STUDY: SIGNIFICANT CHANGE UP

## 2018-01-08 ENCOUNTER — TRANSCRIPTION ENCOUNTER (OUTPATIENT)
Age: 43
End: 2018-01-08

## 2018-01-09 ENCOUNTER — TRANSCRIPTION ENCOUNTER (OUTPATIENT)
Age: 43
End: 2018-01-09

## 2018-01-09 ENCOUNTER — INPATIENT (INPATIENT)
Facility: HOSPITAL | Age: 43
LOS: 2 days | Discharge: HOME | DRG: 152 | End: 2018-01-12
Attending: OTOLARYNGOLOGY | Admitting: GENERAL ACUTE CARE HOSPITAL
Payer: MEDICAID

## 2018-01-09 VITALS — RESPIRATION RATE: 30 BRPM | HEART RATE: 120 BPM

## 2018-01-09 DIAGNOSIS — J05.10 ACUTE EPIGLOTTITIS WITHOUT OBSTRUCTION: ICD-10-CM

## 2018-01-09 DIAGNOSIS — Z98.89 OTHER SPECIFIED POSTPROCEDURAL STATES: Chronic | ICD-10-CM

## 2018-01-09 DIAGNOSIS — Z98.890 OTHER SPECIFIED POSTPROCEDURAL STATES: Chronic | ICD-10-CM

## 2018-01-09 DIAGNOSIS — J04.30 SUPRAGLOTTITIS, UNSPECIFIED, WITHOUT OBSTRUCTION: ICD-10-CM

## 2018-01-09 LAB
ALBUMIN SERPL ELPH-MCNC: 4.4 G/DL — SIGNIFICANT CHANGE UP (ref 3.3–5)
ALP SERPL-CCNC: 78 U/L — SIGNIFICANT CHANGE UP (ref 40–120)
ALT FLD-CCNC: 67 U/L RC — HIGH (ref 10–45)
ANION GAP SERPL CALC-SCNC: 18 MMOL/L — HIGH (ref 5–17)
AST SERPL-CCNC: 132 U/L — HIGH (ref 10–40)
BASE EXCESS BLDV CALC-SCNC: 2.3 MMOL/L — HIGH (ref -2–2)
BASOPHILS # BLD AUTO: 0 K/UL — SIGNIFICANT CHANGE UP (ref 0–0.2)
BASOPHILS NFR BLD AUTO: 0.7 % — SIGNIFICANT CHANGE UP (ref 0–2)
BILIRUB SERPL-MCNC: 0.3 MG/DL — SIGNIFICANT CHANGE UP (ref 0.2–1.2)
BUN SERPL-MCNC: 6 MG/DL — LOW (ref 7–23)
CA-I SERPL-SCNC: 1 MMOL/L — LOW (ref 1.12–1.3)
CALCIUM SERPL-MCNC: 8.6 MG/DL — SIGNIFICANT CHANGE UP (ref 8.4–10.5)
CHLORIDE BLDV-SCNC: 109 MMOL/L — HIGH (ref 96–108)
CHLORIDE SERPL-SCNC: 100 MMOL/L — SIGNIFICANT CHANGE UP (ref 96–108)
CO2 BLDV-SCNC: 29 MMOL/L — SIGNIFICANT CHANGE UP (ref 22–30)
CO2 SERPL-SCNC: 23 MMOL/L — SIGNIFICANT CHANGE UP (ref 22–31)
CREAT SERPL-MCNC: 0.59 MG/DL — SIGNIFICANT CHANGE UP (ref 0.5–1.3)
EOSINOPHIL # BLD AUTO: 0 K/UL — SIGNIFICANT CHANGE UP (ref 0–0.5)
EOSINOPHIL NFR BLD AUTO: 0.8 % — SIGNIFICANT CHANGE UP (ref 0–6)
FLUAV H1 2009 PAND RNA SPEC QL NAA+PROBE: DETECTED
GAS PNL BLDV: 141 MMOL/L — SIGNIFICANT CHANGE UP (ref 136–145)
GAS PNL BLDV: SIGNIFICANT CHANGE UP
GAS PNL BLDV: SIGNIFICANT CHANGE UP
GLUCOSE BLDC GLUCOMTR-MCNC: 150 MG/DL — HIGH (ref 70–99)
GLUCOSE BLDV-MCNC: 102 MG/DL — HIGH (ref 70–99)
GLUCOSE SERPL-MCNC: 104 MG/DL — HIGH (ref 70–99)
HCG SERPL-ACNC: <2 MIU/ML — LOW (ref 5–24)
HCO3 BLDV-SCNC: 27 MMOL/L — SIGNIFICANT CHANGE UP (ref 21–29)
HCT VFR BLD CALC: 36.4 % — SIGNIFICANT CHANGE UP (ref 34.5–45)
HCT VFR BLDA CALC: 36 % — LOW (ref 39–50)
HGB BLD CALC-MCNC: 11.7 G/DL — SIGNIFICANT CHANGE UP (ref 11.5–15.5)
HGB BLD-MCNC: 11.6 G/DL — SIGNIFICANT CHANGE UP (ref 11.5–15.5)
LACTATE BLDV-MCNC: 4.6 MMOL/L — CRITICAL HIGH (ref 0.7–2)
LYMPHOCYTES # BLD AUTO: 2.7 K/UL — SIGNIFICANT CHANGE UP (ref 1–3.3)
LYMPHOCYTES # BLD AUTO: 62.4 % — HIGH (ref 13–44)
MCHC RBC-ENTMCNC: 27.9 PG — SIGNIFICANT CHANGE UP (ref 27–34)
MCHC RBC-ENTMCNC: 31.8 GM/DL — LOW (ref 32–36)
MCV RBC AUTO: 88 FL — SIGNIFICANT CHANGE UP (ref 80–100)
MONOCYTES # BLD AUTO: 0.6 K/UL — SIGNIFICANT CHANGE UP (ref 0–0.9)
MONOCYTES NFR BLD AUTO: 12.9 % — SIGNIFICANT CHANGE UP (ref 2–14)
NEUTROPHILS # BLD AUTO: 1 K/UL — LOW (ref 1.8–7.4)
NEUTROPHILS NFR BLD AUTO: 23.1 % — LOW (ref 43–77)
PCO2 BLDV: 46 MMHG — SIGNIFICANT CHANGE UP (ref 35–50)
PH BLDV: 7.39 — SIGNIFICANT CHANGE UP (ref 7.35–7.45)
PLATELET # BLD AUTO: 223 K/UL — SIGNIFICANT CHANGE UP (ref 150–400)
PO2 BLDV: 38 MMHG — SIGNIFICANT CHANGE UP (ref 25–45)
POTASSIUM BLDV-SCNC: 3.8 MMOL/L — SIGNIFICANT CHANGE UP (ref 3.5–5)
POTASSIUM SERPL-MCNC: 3.7 MMOL/L — SIGNIFICANT CHANGE UP (ref 3.5–5.3)
POTASSIUM SERPL-SCNC: 3.7 MMOL/L — SIGNIFICANT CHANGE UP (ref 3.5–5.3)
PROT SERPL-MCNC: 8.8 G/DL — HIGH (ref 6–8.3)
RAPID RVP RESULT: DETECTED
RBC # BLD: 4.14 M/UL — SIGNIFICANT CHANGE UP (ref 3.8–5.2)
RBC # FLD: 16.3 % — HIGH (ref 10.3–14.5)
SAO2 % BLDV: 57 % — LOW (ref 67–88)
SODIUM SERPL-SCNC: 141 MMOL/L — SIGNIFICANT CHANGE UP (ref 135–145)
WBC # BLD: 4.4 K/UL — SIGNIFICANT CHANGE UP (ref 3.8–10.5)
WBC # FLD AUTO: 4.4 K/UL — SIGNIFICANT CHANGE UP (ref 3.8–10.5)

## 2018-01-09 PROCEDURE — 99291 CRITICAL CARE FIRST HOUR: CPT

## 2018-01-09 PROCEDURE — 99292 CRITICAL CARE ADDL 30 MIN: CPT

## 2018-01-09 PROCEDURE — 71045 X-RAY EXAM CHEST 1 VIEW: CPT | Mod: 26

## 2018-01-09 RX ORDER — ENOXAPARIN SODIUM 100 MG/ML
40 INJECTION SUBCUTANEOUS DAILY
Qty: 0 | Refills: 0 | Status: DISCONTINUED | OUTPATIENT
Start: 2018-01-09 | End: 2018-01-12

## 2018-01-09 RX ORDER — ACETAMINOPHEN 500 MG
1000 TABLET ORAL ONCE
Qty: 0 | Refills: 0 | Status: COMPLETED | OUTPATIENT
Start: 2018-01-09 | End: 2018-01-09

## 2018-01-09 RX ORDER — LEVOTHYROXINE SODIUM 125 MCG
25 TABLET ORAL AT BEDTIME
Qty: 0 | Refills: 0 | Status: DISCONTINUED | OUTPATIENT
Start: 2018-01-09 | End: 2018-01-11

## 2018-01-09 RX ORDER — ACETAMINOPHEN 500 MG
1000 TABLET ORAL ONCE
Qty: 0 | Refills: 0 | Status: COMPLETED | OUTPATIENT
Start: 2018-01-10 | End: 2018-01-10

## 2018-01-09 RX ORDER — SODIUM CHLORIDE 9 MG/ML
1000 INJECTION, SOLUTION INTRAVENOUS
Qty: 0 | Refills: 0 | Status: DISCONTINUED | OUTPATIENT
Start: 2018-01-09 | End: 2018-01-10

## 2018-01-09 RX ORDER — DEXAMETHASONE 0.5 MG/5ML
10 ELIXIR ORAL EVERY 6 HOURS
Qty: 0 | Refills: 0 | Status: DISCONTINUED | OUTPATIENT
Start: 2018-01-09 | End: 2018-01-11

## 2018-01-09 RX ORDER — SODIUM CHLORIDE 9 MG/ML
1000 INJECTION INTRAMUSCULAR; INTRAVENOUS; SUBCUTANEOUS ONCE
Qty: 0 | Refills: 0 | Status: COMPLETED | OUTPATIENT
Start: 2018-01-09 | End: 2018-01-09

## 2018-01-09 RX ORDER — DEXAMETHASONE 0.5 MG/5ML
10 ELIXIR ORAL ONCE
Qty: 0 | Refills: 0 | Status: COMPLETED | OUTPATIENT
Start: 2018-01-09 | End: 2018-01-09

## 2018-01-09 RX ORDER — EPINEPHRINE 11.25MG/ML
1 SOLUTION, NON-ORAL INHALATION ONCE
Qty: 0 | Refills: 0 | Status: COMPLETED | OUTPATIENT
Start: 2018-01-09 | End: 2018-01-09

## 2018-01-09 RX ORDER — INSULIN LISPRO 100/ML
VIAL (ML) SUBCUTANEOUS EVERY 6 HOURS
Qty: 0 | Refills: 0 | Status: DISCONTINUED | OUTPATIENT
Start: 2018-01-09 | End: 2018-01-11

## 2018-01-09 RX ORDER — ACETAMINOPHEN 500 MG
1000 TABLET ORAL ONCE
Qty: 0 | Refills: 0 | Status: DISCONTINUED | OUTPATIENT
Start: 2018-01-09 | End: 2018-01-09

## 2018-01-09 RX ORDER — PROPOFOL 10 MG/ML
15 INJECTION, EMULSION INTRAVENOUS
Qty: 1000 | Refills: 0 | Status: DISCONTINUED | OUTPATIENT
Start: 2018-01-09 | End: 2018-01-10

## 2018-01-09 RX ADMIN — Medication 102 MILLIGRAM(S): at 23:52

## 2018-01-09 RX ADMIN — Medication 400 MILLIGRAM(S): at 23:16

## 2018-01-09 RX ADMIN — Medication 25 MICROGRAM(S): at 23:27

## 2018-01-09 RX ADMIN — Medication 100 MILLIGRAM(S): at 18:56

## 2018-01-09 RX ADMIN — Medication 1 MILLILITER(S): at 18:47

## 2018-01-09 RX ADMIN — SODIUM CHLORIDE 4000 MILLILITER(S): 9 INJECTION INTRAMUSCULAR; INTRAVENOUS; SUBCUTANEOUS at 18:57

## 2018-01-09 RX ADMIN — SODIUM CHLORIDE 4000 MILLILITER(S): 9 INJECTION INTRAMUSCULAR; INTRAVENOUS; SUBCUTANEOUS at 18:21

## 2018-01-09 RX ADMIN — Medication 1000 MILLIGRAM(S): at 23:31

## 2018-01-09 RX ADMIN — Medication 102 MILLIGRAM(S): at 18:42

## 2018-01-09 NOTE — CONSULT NOTE ADULT - ATTENDING COMMENTS
Epiglottitis  Acute resp failure  Upper airway obstruction    Vent adj based on ABG  Sedation with propofol  Abx clinda Levaquin ( PCN allergy)  IV steroid  ISS for glycemic control  Daily assessment by ENT for extubation

## 2018-01-09 NOTE — ED ADULT NURSE NOTE - OBJECTIVE STATEMENT
42 y.o F presents to the ED c/o difficulty breathing. Patient's mother states that she picked the patient up from the airport this afternoon after being in [place] for 9 days and brought her home; states patient has been having difficulty breathing accompanied by productive cough (green sputum) so patient called her PCP and instructed her to go to urgent care. Patient's mother took patient to urgent care where they gave her a duoneb and then instructed them to " go to the ED for further evaluation". Patient presents to the ED A&Ox3 and ambulatory; denies C/P but states but feels sore from heavy breathing; patient has RR of 26 and SPO2 100% on room air, lungs clear on auscultation. Patient is currently being worked up outpatient "for autoimmune disorders"- patient reports having a liver biopsy 2 weeks ago. 42 y.o F presents to the ED c/o difficulty breathing. Patient's mother states that she picked the patient up from the airport this afternoon after being in [place] for 9 days and brought her home; states patient has been having difficulty breathing accompanied by productive cough (green sputum) so patient called her PCP and instructed her to go to urgent care. Patient's mother took patient to urgent care where they gave her a duoneb and then instructed them to " go to the ED for further evaluation". Patient presents to the ED A&Ox3 and ambulatory; denies C/P but states but feels sore from heavy breathing; patient has RR of 26 and SPO2 100% on room air, lungs clear on auscultation but there is audible upper airway noise; denies difficulty swallowing and denies drooling. Patient is currently being worked up outpatient "for autoimmune disorders"- patient reports having a liver biopsy 2 weeks ago.

## 2018-01-09 NOTE — ED ADULT NURSE REASSESSMENT NOTE - NS ED NURSE REASSESS COMMENT FT1
Patient awake and alert with clear speech and no drooling noted.  Patient has audible stridor.  EKG being done at bedside.  Patient on CM in SR and patient updated on plan of care.  Safety ensured.

## 2018-01-09 NOTE — H&P ADULT - HISTORY OF PRESENT ILLNESS
43 y/o M w/ a pmh significant for hypothyroidism (on synthroid), psoriasis (previously on humira, discontinued roughly 1-2 months ago), recurrent sinusitis, and recent dx of autoimmune hepatitis based on liver bx (1/2018) presenting w/ a cc of dysphagia/odynophagia and SOB of 4 days duration. Pt reports she was in [place] for last week and began having progressively worsening difficulty with swallowing solids and liquids  and SOB at rest 4 days ago. She went to an urgent care center today and got duonebs, then was referred to the ED. She also endorses hx of fevers, chills, and persistent non-productive cough during this time. She denies any night sweats, rashes, lip/tongue/facial swelling, HA, blurry vision, rhinorrhea, nasal congestion, CP, abdominal pain, dysuria, or melena. She also denies any known sick contacts but did take flights to and from [place]. No hx of asthma or intubations. States had all childhood vaccinations. Denies any tobacco or recreational drug use, reports occasional ETOH use.     In the ED, pt noted to be tachypneic to 30 but maintaining O2 sats in the mid-to-high 90s. Also with audible stridor. Evaluated by ENT with laryngoscopy and noted to have epiglottitis/supraglottitis. Got IV decadron 10mg, IV valium 2mg, racemic epi, IV tylenol, and IV Clinda 600mg.

## 2018-01-09 NOTE — ED PROVIDER NOTE - ATTENDING CONTRIBUTION TO CARE
sob for 4 days, worsening. afebrile. tachypneic, nl o2 sat. poor insp effort, due to effort challenging to hear lung sounds but likely clear. tachy reg. soft abdominal exam. not drooling but pt states cannot swallow. upper airway noises very loud - can hear at door / hallway. upper airway obstruction - ENT consult, consideration of management of airway - right now protecting but desats to 91% occ. then back to 100. good waveform, multiple times. decadron. may empirically intubate.

## 2018-01-09 NOTE — CONSULT NOTE ADULT - SUBJECTIVE AND OBJECTIVE BOX
HISTORY OF PRESENT ILLNESS:  42F with PMH of autoimmune hepatitis, psoriasis, hypothyroidism, pineal gland cyst presented to ED with complaints of cough, congestion and sore throat x few weeks, which have been worsening in the past four days with new associated throat tightness, shortness of breath and dysphagia with solids and liquids. Pt reports she was in [place] for last week and began having progressively worsening difficulty with swallowing solids and liquids and SOB at rest 4 days ago. She went to an urgent care center today and got duonebs, then was referred to the ED. No hx of asthma or intubations. States had all childhood vaccinations.   ED: Patient received IV decadron 10mg, IV valium 2mg, racemic epi, IV tylenol, and IV Clinda 600mg. Patient evaluated by ENT in ED via FOE/laryngoscopy and noted to have supraglottitis/epiglottitis. In ED patient initially noted to be stridorous and tachypneic to 30s, saturating 100% on RA without posturing, drooling, and no acute respiratory distress however, began to acutely worsen and was taken to the OR for intubation by ENT.   SICU consulted for continued airway monitoring post OR intubation     PAST MEDICAL HISTORY: Autoimmune hepatitis  Psoriasis  Pineal gland cyst  Hypothyroid      PAST SURGICAL HISTORY: History of liver biopsy  History of knee surgery    FAMILY HISTORY: unknown     SOCIAL HISTORY:  Denies any tobacco or recreational drug use, reports occasional ETOH use.    CODE STATUS: Full code     HOME MEDICATIONS:    ALLERGIES: Augmentin (Hepatotoxicity)  Compazine (Dystonic RXN)  No Known Allergies  Zofran (Dystonic RXN)      VITAL SIGNS:  ICU Vital Signs Last 24 Hrs  T(C): 36.4 (09 Jan 2018 22:00), Max: 36.9 (09 Jan 2018 18:39)  T(F): 97.6 (09 Jan 2018 22:00), Max: 98.5 (09 Jan 2018 18:39)  HR: 70 (09 Jan 2018 22:00) (70 - 120)  BP: 98/64 (09 Jan 2018 22:00) (98/64 - 139/109)  BP(mean): 76 (09 Jan 2018 22:00) (76 - 76)  ABP: --  ABP(mean): --  RR: 12 (09 Jan 2018 22:00) (12 - 30)  SpO2: 100% (09 Jan 2018 22:00) (97% - 100%)      NEURO  Exam: intubated and sedated     RESPIRATORY:   Mechanical Ventilation:     Exam: clear to auscultation bilaterally       CARDIOVASCULAR  VBG - ( 09 Jan 2018 20:02 )  pH: 7.34  /  pCO2: 41    /  pO2: 43    / HCO3: 21    / Base Excess: -3.7  /  SaO2: 65     Lactate: 5.9      Exam: s1s2, rrr  Cardiac Rhythm: sinus    GI/NUTRITION  Exam: soft, nt, nd  Diet: NPO      GENITOURINARY/RENAL      Weight (kg): 52.2 (01-09 @ 18:17)  01-09    141  |  100  |  6<L>  ----------------------------<  104<H>  3.7   |  23  |  0.59    Ca    8.6      09 Jan 2018 18:10    TPro  8.8<H>  /  Alb  4.4  /  TBili  0.3  /  DBili  x   /  AST  132<H>  /  ALT  67<H>  /  AlkPhos  78  01-09    []Lopez catheter, indication: urine output monitoring in critically ill patient    HEMATOLOGIC  [ ] VTE Prophylaxis:                          11.6   4.4   )-----------( 223      ( 09 Jan 2018 18:10 )             36.4       Transfusion: [ ] PRBC	[ ] Platelets	[ ] FFP	[ ] Cryoprecipitate      INFECTIOUS DISEASES    RECENT CULTURES:      ENDOCRINE    CAPILLARY BLOOD GLUCOSE          PATIENT CARE ACCESS DEVICES:  [x Peripheral IV  [ ] Central Venous Line	[ ] R	[ ] L	[ ] IJ	[ ] Fem	[ ] SC	Placed:   [ ] Arterial Line		[ ] R	[ ] L	[ ] Fem	[ ] Rad	[ ] Ax	Placed:   [ ] PICC:					[ ] Mediport  [ ] Urinary Catheter, Date Placed:   [x] Necessity of urinary, arterial, and venous catheters discussed    OTHER MEDICATIONS:     IMAGING STUDIES: HISTORY OF PRESENT ILLNESS:  42F with PMH of autoimmune hepatitis, psoriasis, hypothyroidism, pineal gland cyst presented to ED with complaints of cough, congestion and sore throat x few weeks, which have been worsening in the past four days with new associated throat tightness, shortness of breath and dysphagia with solids and liquids. Pt reports she was in [place] for last week and began having progressively worsening difficulty with swallowing solids and liquids and SOB at rest 4 days ago. She went to an urgent care center today and got duonebs, then was referred to the ED. No hx of asthma or intubations. States had all childhood vaccinations.   ED: Patient received IV decadron 10mg, IV valium 2mg, racemic epi, IV tylenol, and IV Clinda 600mg. Patient evaluated by ENT in ED via FOE/laryngoscopy and noted to have supraglottitis/epiglottitis. In ED patient initially noted to be stridorous and tachypneic to 30s, saturating 100% on RA without posturing, drooling, and no acute respiratory distress however, began to acutely worsen and was taken to the OR for intubation by ENT.   SICU consulted for continued airway monitoring post OR intubation     PAST MEDICAL HISTORY: Autoimmune hepatitis  Psoriasis  Pineal gland cyst  Hypothyroid      PAST SURGICAL HISTORY: History of liver biopsy  History of knee surgery    FAMILY HISTORY: unknown     SOCIAL HISTORY:  Denies any tobacco or recreational drug use, reports occasional ETOH use.    CODE STATUS: Full code     HOME MEDICATIONS:    ALLERGIES: Augmentin (Hepatotoxicity)  Compazine (Dystonic RXN)  No Known Allergies  Zofran (Dystonic RXN)      VITAL SIGNS:  ICU Vital Signs Last 24 Hrs  T(C): 36.4 (09 Jan 2018 22:00), Max: 36.9 (09 Jan 2018 18:39)  T(F): 97.6 (09 Jan 2018 22:00), Max: 98.5 (09 Jan 2018 18:39)  HR: 70 (09 Jan 2018 22:00) (70 - 120)  BP: 98/64 (09 Jan 2018 22:00) (98/64 - 139/109)  BP(mean): 76 (09 Jan 2018 22:00) (76 - 76)  ABP: --  ABP(mean): --  RR: 12 (09 Jan 2018 22:00) (12 - 30)  SpO2: 100% (09 Jan 2018 22:00) (97% - 100%)      NEURO  Exam:     RESPIRATORY: intubated   Mechanical Ventilation: PRVC 450/14/5/30%    Exam: clear to auscultation bilaterally       CARDIOVASCULAR  VBG - ( 09 Jan 2018 20:02 )  pH: 7.34  /  pCO2: 41    /  pO2: 43    / HCO3: 21    / Base Excess: -3.7  /  SaO2: 65     Lactate: 5.9      Exam: s1s2, rrr  Cardiac Rhythm: sinus    GI/NUTRITION  Exam: soft, nt, nd  Diet: NPO      GENITOURINARY/RENAL      Weight (kg): 52.2 (01-09 @ 18:17)  01-09    141  |  100  |  6<L>  ----------------------------<  104<H>  3.7   |  23  |  0.59    Ca    8.6      09 Jan 2018 18:10    TPro  8.8<H>  /  Alb  4.4  /  TBili  0.3  /  DBili  x   /  AST  132<H>  /  ALT  67<H>  /  AlkPhos  78  01-09    []Lopez catheter, indication: urine output monitoring in critically ill patient    HEMATOLOGIC  [ ] VTE Prophylaxis:                          11.6   4.4   )-----------( 223      ( 09 Jan 2018 18:10 )             36.4       Transfusion: [ ] PRBC	[ ] Platelets	[ ] FFP	[ ] Cryoprecipitate      INFECTIOUS DISEASES    RECENT CULTURES:      ENDOCRINE    CAPILLARY BLOOD GLUCOSE          PATIENT CARE ACCESS DEVICES:  [x Peripheral IV  [ ] Central Venous Line	[ ] R	[ ] L	[ ] IJ	[ ] Fem	[ ] SC	Placed:   [ ] Arterial Line		[ ] R	[ ] L	[ ] Fem	[ ] Rad	[ ] Ax	Placed:   [ ] PICC:					[ ] Mediport  [ ] Urinary Catheter, Date Placed:   [x] Necessity of urinary, arterial, and venous catheters discussed    OTHER MEDICATIONS:     IMAGING STUDIES: HISTORY OF PRESENT ILLNESS:  42F with PMH of autoimmune hepatitis, psoriasis, hypothyroidism, pineal gland cyst presented to ED with complaints of cough, congestion and sore throat x few weeks, which have been worsening in the past four days with new associated throat tightness, shortness of breath and dysphagia with solids and liquids. Pt reports she was in [place] for last week and began having progressively worsening difficulty with swallowing solids and liquids and SOB at rest 4 days ago. She went to an urgent care center today and got duonebs, then was referred to the ED. No hx of asthma or intubations. States had all childhood vaccinations.   ED: Patient received IV decadron 10mg, IV valium 2mg, racemic epi, IV tylenol, and IV Clinda 600mg. Patient evaluated by ENT in ED via FOE/laryngoscopy and noted to have supraglottitis/epiglottitis. In ED patient initially noted to be stridorous and tachypneic to 30s, saturating 100% on RA without posturing, drooling, and no acute respiratory distress however, began to acutely worsen and was taken to the OR for intubation by ENT.   SICU consulted for continued airway monitoring post OR intubation     PAST MEDICAL HISTORY: Autoimmune hepatitis  Psoriasis  Pineal gland cyst  Hypothyroid      PAST SURGICAL HISTORY: History of liver biopsy  History of knee surgery    FAMILY HISTORY: unknown     SOCIAL HISTORY:  Denies any tobacco or recreational drug use, reports occasional ETOH use.    CODE STATUS: Full code     HOME MEDICATIONS:    ALLERGIES: Augmentin (Hepatotoxicity)  Compazine (Dystonic RXN)  No Known Allergies  Zofran (Dystonic RXN)      VITAL SIGNS:  ICU Vital Signs Last 24 Hrs  T(C): 36.4 (09 Jan 2018 22:00), Max: 36.9 (09 Jan 2018 18:39)  T(F): 97.6 (09 Jan 2018 22:00), Max: 98.5 (09 Jan 2018 18:39)  HR: 70 (09 Jan 2018 22:00) (70 - 120)  BP: 98/64 (09 Jan 2018 22:00) (98/64 - 139/109)  BP(mean): 76 (09 Jan 2018 22:00) (76 - 76)  ABP: --  ABP(mean): --  RR: 12 (09 Jan 2018 22:00) (12 - 30)  SpO2: 100% (09 Jan 2018 22:00) (97% - 100%)      NEURO  Exam: intubated on propofol     RESPIRATORY: intubated   Mechanical Ventilation: PRVC 450/14/5/30%    Exam: clear to auscultation bilaterally       CARDIOVASCULAR  VBG - ( 09 Jan 2018 20:02 )  pH: 7.34  /  pCO2: 41    /  pO2: 43    / HCO3: 21    / Base Excess: -3.7  /  SaO2: 65     Lactate: 5.9      Exam: s1s2, rrr  Cardiac Rhythm: sinus    GI/NUTRITION  Exam: soft, nd  Diet: NPO      GENITOURINARY/RENAL      Weight (kg): 52.2 (01-09 @ 18:17)  01-09    141  |  100  |  6<L>  ----------------------------<  104<H>  3.7   |  23  |  0.59    Ca    8.6      09 Jan 2018 18:10    TPro  8.8<H>  /  Alb  4.4  /  TBili  0.3  /  DBili  x   /  AST  132<H>  /  ALT  67<H>  /  AlkPhos  78  01-09    []Lopez catheter, indication: urine output monitoring in critically ill patient    HEMATOLOGIC  [ ] VTE Prophylaxis:                          11.6   4.4   )-----------( 223      ( 09 Jan 2018 18:10 )             36.4       Transfusion: [ ] PRBC	[ ] Platelets	[ ] FFP	[ ] Cryoprecipitate      INFECTIOUS DISEASES    RECENT CULTURES:      ENDOCRINE    CAPILLARY BLOOD GLUCOSE          PATIENT CARE ACCESS DEVICES:  [x Peripheral IV  [ ] Central Venous Line	[ ] R	[ ] L	[ ] IJ	[ ] Fem	[ ] SC	Placed:   [ ] Arterial Line		[ ] R	[ ] L	[ ] Fem	[ ] Rad	[ ] Ax	Placed:   [ ] PICC:					[ ] Mediport  [ ] Urinary Catheter, Date Placed:   [x] Necessity of urinary, arterial, and venous catheters discussed    OTHER MEDICATIONS:     IMAGING STUDIES:

## 2018-01-09 NOTE — H&P ADULT - ASSESSMENT
41 y/o M w/ a pmh significant for hypothyroidism (on synthroid), psoriasis (previously on humira, discontinued roughly 1-2 months ago), recurrent sinusitis, and recent dx of autoimmune hepatitis based on liver bx (1/2018) presenting w/ a cc of dysphagia/odynophagia and SOB of 4 days duration, evaluated by ENT and noted to have epiglottitis/supraglottitis and admitted to the MICU for airway monitoring.    Neuro:  No active issues. AAoX3.    CV:   Normotensive, tachycardic in setting of moderate distress.  EKG wnl.     Pulm:  Afebrile, non toxic appearing, no leukocytosis. Pt in moderate respiratory distress, increased work of breathing. Pt s/p     FOE/laryngoscopy showing airway swelling. Pt is s/p Clindamycin 600mg x1, Decadron 10mg, racemic Epinephrine, and Valium 2mg with moderate improvement in symptoms.  Prelim CXR w/ no emergent findings  Recommend: Repeate lactate and send Bcx/ RVP. If lactatae is persitently elevated coudld be in setting of increased work of breathing or underlying infection. Pt s/p Clindamycin x1 would begin Ceftriaxone and Azithromycin to cover community acquired pathogens. Given significant respiratory distress and potential for airway compromise would receommend re consult MICU for close airway     monitoring/potential endotracheal intubation.     GI:  Elevated liver enzymes. Consider checking hepatitis panel and Toxicology screen as pt was given dx of autoimmune hepatits but cannot rule out Tox/ infectious causes.     Renal:  No active issues    Heme:  No active issues.    Endo:  Hypothyroidism- c/w home synthroid    Skin:  Hx of Psoriasis was previously on Humira and was advised to d/c 2 months ago.   No active issues or concern for underlying infectious process.     DVT ppx:   No active concern would cover with Lovenox while hospitalized. 43 y/o M w/ a pmh significant for hypothyroidism (on synthroid), psoriasis (previously on humira, discontinued roughly 1-2 months ago), recurrent sinusitis, and recent dx of autoimmune hepatitis based on liver bx (1/2018) presenting w/ a cc of dysphagia/odynophagia and SOB of 4 days duration, evaluated by ENT and noted to have epiglottitis/supraglottitis and admitted to the MICU for airway monitoring.    Neuro:  No active issues. AAoX3.    CV:   Normotensive, tachycardic in setting of moderate distress.  EKG wnl.     Pulm:  Afebrile, non toxic appearing, no leukocytosis. Pt in moderate respiratory distress, increased work of breathing. Pt s/p   FOE/laryngoscopy showing airway swelling (epiglottits/supraglottitis). Pt is s/p Clindamycin 600mg x1, IV Decadron 10mg, racemic Epinephrine, and IV Valium 2mg with moderate improvement in symptoms.  Prelim CXR w/ no emergent findings  Recommend: Repeat lactate and send Bcx/ RVP. If lactate is persistently elevated, could be in setting of increased work of breathing or underlying infection. Pt s/p Clindamycin x1, would begin Ceftriaxone and Azithromycin to cover community acquired pathogens. Given significant respiratory distress and potential for airway compromise, will admit to MICU for close airway   monitoring/potential endotracheal intubation.     GI:  Elevated liver enzymes. Consider checking hepatitis panel and Toxicology screen as pt was given dx of autoimmune hepatitis but cannot rule out Tox/ infectious causes.     Renal:  No active issues    Heme:  No active issues.    Endo:  Hypothyroidism- c/w home synthroid    Skin:  Hx of Psoriasis was previously on Humira and was advised to d/c roughly 1-2 months ago.   No active issues or concern for underlying infectious process.     DVT ppx:   No active concern would cover with Lovenox while hospitalized.    Keira Alvarado PGY-3  [phone]

## 2018-01-09 NOTE — H&P ADULT - NSHPATTENDINGPLANDISCUSS_GEN_ALL_CORE
Case discusses extensively with patient, parents at the bedside, staff, team and specialist on board.

## 2018-01-09 NOTE — ED PROVIDER NOTE - NS ED ROS FT
ROS: no CP +SOB. +cough. +fever. no n/v/d/c. no abd pain. no rash. no bleeding. no urinary complaints. no weakness. no vision changes. no HA. no neck/back pain. no extremity swelling/deformity. No change in mental status.

## 2018-01-09 NOTE — H&P ADULT - NSHPLABSRESULTS_GEN_ALL_CORE
11.6   4.4   )-----------( 223      ( 09 Jan 2018 18:10 )             36.4     01-09    141  |  100  |  6<L>  ----------------------------<  104<H>  3.7   |  23  |  0.59    Ca    8.6      09 Jan 2018 18:10    TPro  8.8<H>  /  Alb  4.4  /  TBili  0.3  /  DBili  x   /  AST  132<H>  /  ALT  67<H>  /  AlkPhos  78  01-09

## 2018-01-09 NOTE — ED PROVIDER NOTE - CRITICAL CARE PROVIDED
direct patient care (not related to procedure)/additional history taking/interpretation of diagnostic studies/consult w/ pt's family directly relating to pts condition/documentation/consultation with other physicians

## 2018-01-09 NOTE — H&P ADULT - NSHPPHYSICALEXAM_GEN_ALL_CORE
General: Moderate distress, anxious appearing  HEENT: Normocephalic, atraumatic.  PERRL.  EOMI.  No scleral icterus.  Moist MM.  No oropharyngeal exudates.    Neck: Supple.  Full range of motion.  No JVD.  No carotid bruits.  No thyromegaly.  Trachea midline.  No lymphadenopathy.   Heart: Tachycardic.  Normal S1 and S2.    Lungs: Audible stridor when speaking, unable to complete full sentences, no tripoding or drooling, mildly tachypneic  Abdomen: BS+, soft, NT/ND.  No organomegaly.  Extremities: No edema, clubbing, or cyanosis.  2+ peripheral pulses b/l.  Neuro: A&Ox3.  CN II-XII intact.  5/5 strength in UE and LE b/l.  Tactile sensation intact in UE and LE b/l.  Cerebellar     function intact as assessed by finger-to-nose test.

## 2018-01-09 NOTE — ED PROVIDER NOTE - PHYSICAL EXAMINATION
Gen: severe respiratory distress with audible stridor and acute distress.   Head: NCAT  HEENT: PERRL, oral mucosa moist, normal conjunctiva, neck supple, normal oropharynx no edema  Lung: severe respiratory distress, mild expiratory wheeze BS symmetric b/l no stridor appreciated on auscultation of neck, patient able to speak in broken sentences. no perioral cyanosis   CV: rrr, no murmur, Normal perfusion  Abd: soft, NTND  MSK: No edema, no visible deformities  Neuro: No focal neurologic deficits  Skin: No rash   Psych: normal affect

## 2018-01-09 NOTE — ED PROVIDER NOTE - MEDICAL DECISION MAKING DETAILS
possible paradoxical vocal cord motion, less likely epiglottitis patient not hypoxic tolerating secretions and on exam doesn't have stridor, and able to talk down and patient able to breathe normally and speak without difficulty just mild hoarse voice. will get ENT to scope. labs. CXR r/o pneumo but unlikely. TBA

## 2018-01-09 NOTE — ED PROVIDER NOTE - PROGRESS NOTE DETAILS
patient has epiglottitis/supraglottitis per ENT, will give clinda, patient initially declined decadron but will give now. also trial racemic epi. MICU consulted -Vicente GANDHI Pt remains in guarded position. Continue to have multiple re-assessments. Will not intubate now. awaiting ENT attg, as noted, ENT PA has partial airway obstruction epiglottitis / supraglottitis. o2 remains 100 but occ goes down to mid 90s when pt agitated. -Mau Fraire MD- patient just able to get up from bed and urinate in commode without significant respiratory distress, sitting comfortably in bed without posturing. no drooling. sating 100%. MICU called again, will see with Attending. RVP held to avoid any airway irritation at this time -Vicente DO MICU Dr Stallworth seen at bedside, agree airway could be compromised currently no distress/not hypoxic. will get RVP. called ENT, Dr. Agudelo still en route, awaiting call for ETA. At this time still not requiring prophylactic intubation, her stridulous events have decreased in severity and frequency. will monitor closely -Conyey DO lactate worsening, anesthesia called, still trying to locate Dr Agudelo, will go to OR for intubation -Vicente GANDHI Pt remains in guarded position. Continue to have multiple re-assessments. Will not intubate now. awaiting ENT attg, as noted, ENT PA has partial airway obstruction epiglottitis / supraglottitis / angioedema . o2 remains 100 but occ goes down to mid 90s when pt agitated. -Mau Fraire MD-

## 2018-01-09 NOTE — ED PROVIDER NOTE - OBJECTIVE STATEMENT
43yo F with difficulty breathing x4 days, +congestion, +sore throat since last night trouble breathing more severe. no CP. no trauma. +subjective fevers. toelrating secretions. h/o autoimmune disease- unknown etiology not currently on tx. had humira in past for psoriasis. no steroids- has had 'bad reactions' in past.

## 2018-01-09 NOTE — CONSULT NOTE ADULT - ASSESSMENT
41 yo female with persistent URI complicated by liver dysfxn with augmentin tx. Pt states her sxs persisted and has had worsening throat tightness and difficulty breathing and swallowing x4 days. Swelling of airway noted on FOE/laryngoscopy, vocal cords unable to be visualized. Pt currently afebrile, WBC 4.4 satting at 100 on room air. Appears very anxious when spoken to with associated tachycardia and tachypnea.

## 2018-01-09 NOTE — CONSULT NOTE ADULT - PROBLEM SELECTOR RECOMMENDATION 9
Recommend decadron and clindamyccin  Continuously monitor O2 saturation, pt currently satting 100 on room air. Intubation may be necessary. Dose of decadron given in ED.

## 2018-01-09 NOTE — H&P ADULT - ATTENDING COMMENTS
This is a 43 y/o female w/ a pmh as detailed prior including  hypothyroidism (on synthroid), psoriasis (previously on adalimumab tx, discontinued roughly 1-2 months ago), recurrent sinusitis, and recent dx of autoimmune hepatitis based on liver bx (1/2018) presenting with  dysphagia/odynophagia and SOB of 4 days duration. Pt reports she was in [place] a week  prior to presentation where she began having progressively worsening difficulty with swallowing solids and liquids coupled with SOB at rest 4 days ago. She went to an urgent care center today and got duonebs, then was referred to the ED. She also endorses hx of fevers, chills, and persistent non-productive cough during this time. Patient now being upgraded to the MICU with -  1.Hypoxemic Respiratory failure - in the setting of FOE/laryngoscopy showing airway swelling (epiglottitis/supraglottitis) , stridor and increase work of breathing. As per family patient is up to date on immunizations but has been on immunomodulator therapy, would need RVP, throat cultures once airway is secure and appropriate radiographic imaging to further delineate underlying pathology of her dysphonia and odynophagia -  -would continue antibiotic therapy pending cultures   -Duonebs, steroids,  pulmonary toilet  2. Transaminitis with elevated lactate in the setting of possible autoimmune hepatitis- monitor hepatic synthetic function, hepatology f/u, check Tylenol level, hepatitis, ASM, antimitochondrial serology, RUQ US  3. Dehydration with mild pre-renal DEMETRIA- IVF hydration, avoid nephrotoxins  4. Lactic acidosis- multifactorial- WOB, sepsis, secondary to epinephrine use vs hepatic dysfunction,  IVF , trend  Care and treatment as detailed above unless otherwise stated. Case discusses extensively with patient, parents at the bedside, staff, team and specialist on board. This is a 41 y/o female w/ a pmh as detailed prior including  hypothyroidism (on synthroid), psoriasis (previously on adalimumab tx, discontinued roughly 1-2 months ago), recurrent sinusitis, and recent dx of autoimmune hepatitis based on liver bx (1/2018) presenting with  dysphagia/odynophagia and SOB of 4 days duration. Pt reports she was in [place] a week  prior to presentation where she began having progressively worsening difficulty with swallowing solids and liquids coupled with SOB at rest 4 days ago. She went to an urgent care center today and got duonebs, then was referred to the ED. She also endorses hx of fevers, chills, and persistent non-productive cough during this time. Patient now being upgraded to the MICU with -  1.Hypoxemic Respiratory failure - in the setting of FOE/laryngoscopy showing airway swelling (epiglottitis/supraglottitis) , stridor and increase work of breathing. As per family patient is up to date on immunizations but has been on immunomodulator therapy, would need RVP, throat cultures once airway is secure and appropriate radiographic imaging to further delineate underlying pathology of her dysphonia and odynophagia -  -would continue antibiotic therapy pending cultures   -Duonebs, steroids,  pulmonary toilet  2. Transaminitis with elevated lactate in the setting of possible autoimmune hepatitis- monitor hepatic synthetic function, hepatology f/u, check Tylenol level, hepatitis, ASM, antimitochondrial serology, RUQ US  3. Dehydration with mild pre-renal DEMETRIA- IVF hydration, avoid nephrotoxins  4. Lactic acidosis- multifactorial- WOB, sepsis, secondary to epinephrine use vs hepatic dysfunction,  IVF , trend  Care and treatment as detailed above unless otherwise stated. Case discusses extensively with patient, parents at the bedside, staff, team and specialist on board.  Addendum- patient now with influenza A and should be treated with oseltamivir as well as post viral antibiotic regiment.

## 2018-01-09 NOTE — CONSULT NOTE ADULT - SUBJECTIVE AND OBJECTIVE BOX
CC: Respiratory distress, stridor    HPI: Patient is a 42y old  Female w PMHx psoriasis, hypothyroidism, pineal gland cyst presents to [facility] ED complaining of cough, congestion and sore throat x few weeks, with worseneing throat tightness and dyspnea in the past four days. Pt states she was being treated for a sinus infection a few weeks ago with augmentin in [facility] pts LFTs were found to be around 1000, had liver biopsy done and both augmentin and humira for psoriasis Dc'd. Pt with persistent sore throat since and began to feel throat tightness 4 days ago in [place]. Pt states she had pain with swallowing solids and liquids, fevers, cough and nasal congestion. Pt currently afebrile, satting at 100 on room air. WBC 4.4. ENT called to examine upper airway via FOE/laryngoscopy. Pt denies hx intubations, asthma, taking ACE/ARB, episodes alike.    PAST MEDICAL & SURGICAL HISTORY:  Psoriasis  Pineal gland cyst  Hypothyroid  History of liver biopsy  History of knee surgery    Allergies    No Known Allergies    Intolerances    Augmentin (Hepatotoxicity)  Compazine (Dystonic RXN)  Zofran (Dystonic RXN)    MEDICATIONS  (STANDING):  acetaminophen  IVPB. 1000 milliGRAM(s) IV Intermittent once    MEDICATIONS  (PRN):    Social History: unknown if smoker    ROS: ENT, GI, , CV, Pulm, Neuro, Psych, MS, Heme, Endo, Constitutional; all negative except as noted in HPI    Vital Signs Last 24 Hrs  T(C): 36.9 (09 Jan 2018 18:39), Max: 36.9 (09 Jan 2018 18:39)  T(F): 98.5 (09 Jan 2018 18:39), Max: 98.5 (09 Jan 2018 18:39)  HR: 86 (09 Jan 2018 18:39) (86 - 120)  BP: 112/84 (09 Jan 2018 18:39) (112/84 - 139/109)  BP(mean): --  RR: 24 (09 Jan 2018 18:39) (24 - 30)  SpO2: 100% (09 Jan 2018 18:39) (100% - 100%)                          11.6   4.4   )-----------( 223      ( 09 Jan 2018 18:10 )             36.4    01-09    141  |  100  |  6<L>  ----------------------------<  104<H>  3.7   |  23  |  0.59    Ca    8.6      09 Jan 2018 18:10    TPro  8.8<H>  /  Alb  4.4  /  TBili  0.3  /  DBili  x   /  AST  132<H>  /  ALT  67<H>  /  AlkPhos  78  01-09       PHYSICAL EXAM:  Gen: Anxious, difficulty speaking  Head: Normocephalic, Atraumatic  Face: no edema/erythema/fluctuance, parotid glands soft without mass  Eyes: No scleral injection  Nose: Nares bilaterally patent, no discharge  Mouth: Mucosa moist, tongue/uvula midline, tonsilar erythema, non edematous and without exudates  Neck: Flat, supple, no lymphadenopathy, trachea midline, no masses  Resp: Use of accessory muscles, stridorous  CV: no peripheral edema/cyanosis    FOE/Laryngoscopy: scope #4 No bleeding in nasal pharynx or Oral pharynx.  No foreign body. Pooling of seceretions. (+)glottic / supraglottic swelling.  unable to visualize vocal cords.

## 2018-01-09 NOTE — CONSULT NOTE ADULT - ASSESSMENT
42F with PMH of autoimmune hepatitis, psoriasis, hypothyroidism, pineal gland cyst presenting with complaints of cough, congestion, sore throat, throat tightness, shortness of breath and dysphagia evaluated by ENT and noted to have epiglottitis/supraglottitis and emergently intubated in OR for airway protection.     Neuro:   -intubated and sedated    CV:     Resp:     GI:   NPO    Renal:  No active issues    Heme:  No active issues.    Endo: Hypothyroidism  - c/w home synthroid    Dispo: Full code. SICU 42F with PMH of autoimmune hepatitis, psoriasis, hypothyroidism, pineal gland cyst presenting with complaints of cough, congestion, sore throat, throat tightness, shortness of breath and dysphagia evaluated by ENT and noted to have epiglottitis/supraglottitis and emergently intubated in OR for airway protection.     Neuro:   -intubated  -c/w propofol     CV: no active issues  -continue to monitor BP    Resp:   -s/p intubation in OR   -FOE/laryngoscopy showing airway swelling (epiglottits/supraglottitis)  -s/p Clindamycin 600mg x1, IV Decadron 10mg, racemic Epinephrine, and IV Valium 2mg in ED  -f/u rpt CXR  -ENT recs appreciated     GI: hx of autoimmune hepatitis  -NPO  -LFT elevated, continue to monitor     Renal: No active issues  -monitor I/Os     ID: RVP positive influenza   -droplet precaution   -s/p Clindamycin 600mg x1     Heme: No active issues  -lovenox 40mg for VTE prophylaxis     Endo: Hypothyroidism  - c/w home synthroid    Dispo: Full code. SICU 42F with PMH of autoimmune hepatitis, psoriasis, hypothyroidism, pineal gland cyst presenting with complaints of cough, congestion, sore throat, throat tightness, shortness of breath and dysphagia evaluated by ENT and noted to have epiglottitis/supraglottitis and emergently intubated in OR for airway protection.     Neuro:   -intubated  -c/w propofol     CV: no active issues  -continue to monitor BP    Resp:   -s/p intubation in OR w/ FOE/laryngoscopy showing airway swelling (epiglottits/supraglottitis)  -s/p Clindamycin 600mg x1, IV Decadron 10mg, racemic Epinephrine, and IV Valium 2mg in ED  -decadron 10mg q6h  -Clindamycin 600mg q8h   -ENT recommendations appreciated     GI: hx of autoimmune hepatitis  -NPO  -LFT elevated, continue to monitor     Renal: No active issues  -monitor I/Os     ID: RVP positive influenza   -droplet precaution   -s/p Clindamycin 600mg x1     Heme: No active issues  -lovenox 40mg for VTE prophylaxis     Endo: Hypothyroidism  - c/w home synthroid  - continue monitor glucose     Dispo: Full code. SICU 42F with PMH of autoimmune hepatitis, psoriasis, hypothyroidism, pineal gland cyst presenting with complaints of cough, congestion, sore throat, throat tightness, shortness of breath and dysphagia evaluated by ENT and noted to have epiglottitis/supraglottitis and emergently intubated in OR for airway protection.     Neuro:  - intubated  - c/w propofol gtt  - IV tylenol for pain     CV: no active issues  -continue to monitor BP    Resp: epiglottitis s/p intubation  -s/p intubation in OR w/ FOE/laryngoscopy showing airway swelling (epiglottits/supraglottitis)  -s/p Clindamycin 600mg x1, IV Decadron 10mg, racemic Epinephrine, and IV Valium 2mg in ED  -decadron 10mg q6h  -Clindamycin 600mg q8h, levaquin 500  -ENT recommendations appreciated     GI: hx of autoimmune hepatitis  -NPO  -LFT elevated, continue to monitor     Renal: No active issues  -monitor I/Os     ID: Epiglottitis, RVP positive influenza   -droplet precaution   -Clindamycin 600mg q8h, levaquin 500 qd    Heme: No active issues  -lovenox 40mg for VTE prophylaxis     Endo: Hypothyroidism  - c/w home synthroid  - continue monitor glucose, ISS    Dispo: Full code. SICU 42F with PMH of autoimmune hepatitis, psoriasis, hypothyroidism, pineal gland cyst presenting with complaints of cough, congestion, sore throat, throat tightness, shortness of breath and dysphagia evaluated by ENT and noted to have epiglottitis/supraglottitis and emergently intubated in OR for airway protection.     Neuro:  - intubated  - c/w propofol gtt  - IV tylenol for pain     CV: no active issues  -continue to monitor vitals    Resp: epiglottitis s/p intubation  -s/p intubation in OR w/ FOE/laryngoscopy showing airway swelling (epiglottits/supraglottitis)  -s/p Clindamycin 600mg x1, IV Decadron 10mg, racemic Epinephrine, and IV Valium 2mg in ED  -decadron 10mg q6h  -Clindamycin 600mg q8h, levaquin 500  -ENT recommendations appreciated     GI: hx of autoimmune hepatitis  -NPO  -LFT elevated, continue to monitor     Renal: No active issues  -monitor I/Os     ID: Epiglottitis, RVP positive influenza   -droplet precaution   -Clindamycin 600mg q8h, levaquin 500 qd    Heme: No active issues  -lovenox 40mg for VTE prophylaxis     Endo: Hypothyroidism  - c/w home synthroid  -monitor serum glucose, ISS    Dispo: Full code. SICU

## 2018-01-10 DIAGNOSIS — T78.3XXA ANGIONEUROTIC EDEMA, INITIAL ENCOUNTER: ICD-10-CM

## 2018-01-10 LAB
ALBUMIN SERPL ELPH-MCNC: 3.8 G/DL — SIGNIFICANT CHANGE UP (ref 3.3–5)
ALP SERPL-CCNC: 66 U/L — SIGNIFICANT CHANGE UP (ref 40–120)
ALT FLD-CCNC: 57 U/L RC — HIGH (ref 10–45)
ANION GAP SERPL CALC-SCNC: 17 MMOL/L — SIGNIFICANT CHANGE UP (ref 5–17)
AST SERPL-CCNC: 94 U/L — HIGH (ref 10–40)
BILIRUB SERPL-MCNC: 0.2 MG/DL — SIGNIFICANT CHANGE UP (ref 0.2–1.2)
BUN SERPL-MCNC: <4 MG/DL — LOW (ref 7–23)
CALCIUM SERPL-MCNC: 7.8 MG/DL — LOW (ref 8.4–10.5)
CHLORIDE SERPL-SCNC: 101 MMOL/L — SIGNIFICANT CHANGE UP (ref 96–108)
CO2 SERPL-SCNC: 20 MMOL/L — LOW (ref 22–31)
CREAT SERPL-MCNC: 0.49 MG/DL — LOW (ref 0.5–1.3)
GAS PNL BLDA: SIGNIFICANT CHANGE UP
GAS PNL BLDA: SIGNIFICANT CHANGE UP
GLUCOSE BLDC GLUCOMTR-MCNC: 171 MG/DL — HIGH (ref 70–99)
GLUCOSE BLDC GLUCOMTR-MCNC: 173 MG/DL — HIGH (ref 70–99)
GLUCOSE BLDC GLUCOMTR-MCNC: 179 MG/DL — HIGH (ref 70–99)
GLUCOSE BLDC GLUCOMTR-MCNC: 204 MG/DL — HIGH (ref 70–99)
GLUCOSE SERPL-MCNC: 163 MG/DL — HIGH (ref 70–99)
HCT VFR BLD CALC: 32.1 % — LOW (ref 34.5–45)
HGB BLD-MCNC: 10.4 G/DL — LOW (ref 11.5–15.5)
MAGNESIUM SERPL-MCNC: 1.6 MG/DL — SIGNIFICANT CHANGE UP (ref 1.6–2.6)
MCHC RBC-ENTMCNC: 28.8 PG — SIGNIFICANT CHANGE UP (ref 27–34)
MCHC RBC-ENTMCNC: 32.3 GM/DL — SIGNIFICANT CHANGE UP (ref 32–36)
MCV RBC AUTO: 89.1 FL — SIGNIFICANT CHANGE UP (ref 80–100)
PHOSPHATE SERPL-MCNC: 3.1 MG/DL — SIGNIFICANT CHANGE UP (ref 2.5–4.5)
PLATELET # BLD AUTO: 154 K/UL — SIGNIFICANT CHANGE UP (ref 150–400)
POTASSIUM SERPL-MCNC: 4.5 MMOL/L — SIGNIFICANT CHANGE UP (ref 3.5–5.3)
POTASSIUM SERPL-SCNC: 4.5 MMOL/L — SIGNIFICANT CHANGE UP (ref 3.5–5.3)
PROT SERPL-MCNC: 7.8 G/DL — SIGNIFICANT CHANGE UP (ref 6–8.3)
RBC # BLD: 3.6 M/UL — LOW (ref 3.8–5.2)
RBC # FLD: 16.1 % — HIGH (ref 10.3–14.5)
SODIUM SERPL-SCNC: 138 MMOL/L — SIGNIFICANT CHANGE UP (ref 135–145)
WBC # BLD: 1.1 K/UL — LOW (ref 3.8–10.5)
WBC # FLD AUTO: 1.1 K/UL — LOW (ref 3.8–10.5)

## 2018-01-10 PROCEDURE — 93010 ELECTROCARDIOGRAM REPORT: CPT

## 2018-01-10 PROCEDURE — 71045 X-RAY EXAM CHEST 1 VIEW: CPT | Mod: 26

## 2018-01-10 RX ORDER — IPRATROPIUM/ALBUTEROL SULFATE 18-103MCG
3 AEROSOL WITH ADAPTER (GRAM) INHALATION EVERY 6 HOURS
Qty: 0 | Refills: 0 | Status: DISCONTINUED | OUTPATIENT
Start: 2018-01-10 | End: 2018-01-11

## 2018-01-10 RX ORDER — SODIUM CHLORIDE 9 MG/ML
1000 INJECTION, SOLUTION INTRAVENOUS
Qty: 0 | Refills: 0 | Status: DISCONTINUED | OUTPATIENT
Start: 2018-01-10 | End: 2018-01-11

## 2018-01-10 RX ORDER — HYDROMORPHONE HYDROCHLORIDE 2 MG/ML
0.5 INJECTION INTRAMUSCULAR; INTRAVENOUS; SUBCUTANEOUS
Qty: 0 | Refills: 0 | Status: DISCONTINUED | OUTPATIENT
Start: 2018-01-10 | End: 2018-01-10

## 2018-01-10 RX ORDER — IPRATROPIUM/ALBUTEROL SULFATE 18-103MCG
3 AEROSOL WITH ADAPTER (GRAM) INHALATION ONCE
Qty: 0 | Refills: 0 | Status: COMPLETED | OUTPATIENT
Start: 2018-01-10 | End: 2018-01-10

## 2018-01-10 RX ORDER — PANTOPRAZOLE SODIUM 20 MG/1
40 TABLET, DELAYED RELEASE ORAL DAILY
Qty: 0 | Refills: 0 | Status: DISCONTINUED | OUTPATIENT
Start: 2018-01-10 | End: 2018-01-11

## 2018-01-10 RX ORDER — CHLORHEXIDINE GLUCONATE 213 G/1000ML
15 SOLUTION TOPICAL
Qty: 0 | Refills: 0 | Status: DISCONTINUED | OUTPATIENT
Start: 2018-01-10 | End: 2018-01-10

## 2018-01-10 RX ORDER — KETOROLAC TROMETHAMINE 30 MG/ML
30 SYRINGE (ML) INJECTION ONCE
Qty: 0 | Refills: 0 | Status: DISCONTINUED | OUTPATIENT
Start: 2018-01-10 | End: 2018-01-10

## 2018-01-10 RX ORDER — EPINEPHRINE 11.25MG/ML
3 SOLUTION, NON-ORAL INHALATION ONCE
Qty: 0 | Refills: 0 | Status: COMPLETED | OUTPATIENT
Start: 2018-01-10 | End: 2018-01-10

## 2018-01-10 RX ORDER — FENTANYL CITRATE 50 UG/ML
0.5 INJECTION INTRAVENOUS
Qty: 5000 | Refills: 0 | Status: DISCONTINUED | OUTPATIENT
Start: 2018-01-10 | End: 2018-01-10

## 2018-01-10 RX ADMIN — Medication 102 MILLIGRAM(S): at 23:32

## 2018-01-10 RX ADMIN — Medication 102 MILLIGRAM(S): at 06:32

## 2018-01-10 RX ADMIN — PANTOPRAZOLE SODIUM 40 MILLIGRAM(S): 20 TABLET, DELAYED RELEASE ORAL at 11:52

## 2018-01-10 RX ADMIN — HYDROMORPHONE HYDROCHLORIDE 0.5 MILLIGRAM(S): 2 INJECTION INTRAMUSCULAR; INTRAVENOUS; SUBCUTANEOUS at 07:56

## 2018-01-10 RX ADMIN — Medication 400 MILLIGRAM(S): at 11:43

## 2018-01-10 RX ADMIN — Medication 100 MILLIGRAM(S): at 01:39

## 2018-01-10 RX ADMIN — SODIUM CHLORIDE 75 MILLILITER(S): 9 INJECTION, SOLUTION INTRAVENOUS at 21:03

## 2018-01-10 RX ADMIN — Medication 3 MILLILITER(S): at 14:41

## 2018-01-10 RX ADMIN — Medication 100 MILLIGRAM(S): at 17:47

## 2018-01-10 RX ADMIN — Medication 2: at 17:48

## 2018-01-10 RX ADMIN — Medication 102 MILLIGRAM(S): at 17:47

## 2018-01-10 RX ADMIN — FENTANYL CITRATE 1.46 MICROGRAM(S)/KG/HR: 50 INJECTION INTRAVENOUS at 10:22

## 2018-01-10 RX ADMIN — Medication 400 MILLIGRAM(S): at 17:00

## 2018-01-10 RX ADMIN — CHLORHEXIDINE GLUCONATE 15 MILLILITER(S): 213 SOLUTION TOPICAL at 05:55

## 2018-01-10 RX ADMIN — SODIUM CHLORIDE 75 MILLILITER(S): 9 INJECTION, SOLUTION INTRAVENOUS at 10:22

## 2018-01-10 RX ADMIN — Medication 4: at 11:53

## 2018-01-10 RX ADMIN — Medication 102 MILLIGRAM(S): at 11:52

## 2018-01-10 RX ADMIN — Medication 3 MILLILITER(S): at 21:33

## 2018-01-10 RX ADMIN — CHLORHEXIDINE GLUCONATE 15 MILLILITER(S): 213 SOLUTION TOPICAL at 17:49

## 2018-01-10 RX ADMIN — Medication 30 MILLIGRAM(S): at 21:02

## 2018-01-10 RX ADMIN — Medication 2: at 05:55

## 2018-01-10 RX ADMIN — Medication 1 MILLIGRAM(S): at 09:30

## 2018-01-10 RX ADMIN — Medication 1 MILLIGRAM(S): at 00:30

## 2018-01-10 RX ADMIN — Medication 2: at 23:38

## 2018-01-10 RX ADMIN — Medication 1000 MILLIGRAM(S): at 12:00

## 2018-01-10 RX ADMIN — Medication 100 MILLIGRAM(S): at 10:17

## 2018-01-10 RX ADMIN — Medication 400 MILLIGRAM(S): at 05:22

## 2018-01-10 RX ADMIN — HYDROMORPHONE HYDROCHLORIDE 0.5 MILLIGRAM(S): 2 INJECTION INTRAMUSCULAR; INTRAVENOUS; SUBCUTANEOUS at 08:17

## 2018-01-10 RX ADMIN — Medication 30 MILLIGRAM(S): at 21:15

## 2018-01-10 RX ADMIN — HYDROMORPHONE HYDROCHLORIDE 0.5 MILLIGRAM(S): 2 INJECTION INTRAMUSCULAR; INTRAVENOUS; SUBCUTANEOUS at 02:17

## 2018-01-10 RX ADMIN — ENOXAPARIN SODIUM 40 MILLIGRAM(S): 100 INJECTION SUBCUTANEOUS at 11:52

## 2018-01-10 RX ADMIN — Medication 1000 MILLIGRAM(S): at 17:15

## 2018-01-10 NOTE — PROGRESS NOTE ADULT - SUBJECTIVE AND OBJECTIVE BOX
CC:  Patient is a 42y old  Female w PMHx psoriasis, hypothyroidism, pineal gland cyst presents to [facility] ED complaining of cough, congestion and sore throat x few weeks, with worseneing throat tightness and dyspnea in the past four days. Pt states she was being treated for a sinus infection a few weeks ago with augmentin in [facility] pts LFTs were found to be around 1000, had liver biopsy done and both augmentin and humira for psoriasis Dc'd. Pt with persistent sore throat since and began to feel throat tightness 4 days ago in [place]. Pt states she had pain with swallowing solids and liquids, fevers, cough and nasal congestion. Pt underwent indirect laryngoscopy at bedside and was found to have laryngeal edema last night. The true cords weren't visualized. Pt was take to the OR for an emergent intubation. Pt is C/O sore throat, but denies fevers.    HPI:        PAST MEDICAL & SURGICAL HISTORY:  Autoimmune hepatitis  Psoriasis  Pineal gland cyst  Hypothyroid  History of liver biopsy  History of knee surgery    Allergies    No Known Allergies    Intolerances    Augmentin (Hepatotoxicity)  Compazine (Dystonic RXN)  Zofran (Dystonic RXN)    MEDICATIONS  (STANDING):  clindamycin IVPB 600 milliGRAM(s) IV Intermittent every 8 hours  dexamethasone  IVPB 10 milliGRAM(s) IV Intermittent every 6 hours  dextrose 5% + sodium chloride 0.45%. 1000 milliLiter(s) (75 mL/Hr) IV Continuous <Continuous>  enoxaparin Injectable 40 milliGRAM(s) SubCutaneous daily  insulin lispro (HumaLOG) corrective regimen sliding scale   SubCutaneous every 6 hours  levoFLOXacin IVPB 500 milliGRAM(s) IV Intermittent every 24 hours  levothyroxine Injectable 25 MICROGram(s) IV Push at bedtime  pantoprazole  Injectable 40 milliGRAM(s) IV Push daily    MEDICATIONS  (PRN):    Social History: unkonwn if ever smoked    ROS: ENT, GI, , CV, Pulm, Neuro, Psych, MS, Heme, Endo, Constitutional; all negative except as noted in HPI    Vital Signs Last 24 Hrs  T(C): 36.8 (10 Vito 2018 15:00), Max: 36.9 (09 Jan 2018 18:39)  T(F): 98.3 (10 Vito 2018 15:00), Max: 98.5 (09 Jan 2018 18:39)  HR: 43 (10 Vito 2018 18:00) (39 - 93)  BP: 108/72 (10 Vito 2018 18:00) (86/52 - 131/92)  BP(mean): 84 (10 Vito 2018 18:00) (64 - 110)  RR: 17 (10 Vito 2018 18:00) (12 - 25)  SpO2: 99% (10 Vito 2018 18:00) (95% - 100%)                          10.4   1.1   )-----------( 154      ( 10 Vito 2018 03:08 )             32.1    01-10    138  |  101  |  <4<L>  ----------------------------<  163<H>  4.5   |  20<L>  |  0.49<L>    Ca    7.8<L>      10 Vito 2018 03:09  Phos  3.1     01-10  Mg     1.6     01-10    TPro  7.8  /  Alb  3.8  /  TBili  0.2  /  DBili  x   /  AST  94<H>  /  ALT  57<H>  /  AlkPhos  66  01-10       PHYSICAL EXAM:  Gen: NAD, well-developed  Head: Normocephalic, Atraumatic  Face: no edema/erythema/fluctuance, parotid glands soft without mass  Eyes: PERRL, EOMI, no scleral injection  Nose: Nares bilaterally patent, no discharge  Mouth: Mucosa moist, tongue/uvula midline, oropharynx clear  Neck: Flat, supple, no lymphadenopathy, trachea midline, no masses  Resp: no use of accessory muscles, no stridor  CV: no peripheral edema/cyanosis    FOE/Laryngoscopy- scope #1- ET tube visualized between the cords, No epiglottic, arytenoid edema or erythema, +air leak after deflating the ET tube balloon, pooling of secretions, no masses/lesions.

## 2018-01-10 NOTE — PROGRESS NOTE ADULT - ATTENDING COMMENTS
Epiglottitis  Acute reso failure    Vent adj based on ABG  IVF  Sedation with propofol  Decadron  Glycemic control  ENT f/U to assess for extubation

## 2018-01-10 NOTE — AIRWAY REMOVAL NOTE  ADULT & PEDS - ARTIFICAL AIRWAY REMOVAL COMMENTS
Written order for extubation verified.  Patient identified by full name and birth date as per identification band.  Present at the procedure was Dr. Green

## 2018-01-10 NOTE — PROGRESS NOTE ADULT - SUBJECTIVE AND OBJECTIVE BOX
HISTORY  42F with PMH of autoimmune hepatitis, psoriasis, hypothyroidism, pineal gland cyst presented to ED with complaints of cough, congestion and sore throat x few weeks, which have been worsening in the past four days with new associated throat tightness, shortness of breath and dysphagia with solids and liquids. Pt reports she was in [place] for last week and began having progressively worsening difficulty with swallowing solids and liquids and SOB at rest 4 days ago. She went to an urgent care center today and got duonebs, then was referred to the ED. No hx of asthma or intubations. States had all childhood vaccinations.   ED: Patient received IV decadron 10mg, IV valium 2mg, racemic epi, IV tylenol, and IV Clinda 600mg. Patient evaluated by ENT in ED via FOE/laryngoscopy and noted to have supraglottitis/epiglottitis. In ED patient initially noted to be stridorous and tachypneic to 30s, saturating 100% on RA without posturing, drooling, and no acute respiratory distress however, began to acutely worsen and was taken to the OR for intubation by ENT.   SICU consulted for continued airway monitoring post OR intubation     24 HOUR EVENTS: Patient taken to OR by ENT for emergent intubation for supra/epiglottitis. Brought to SICU for continued monitoring. Patient acutely agitated given 1mg ativan. Otherwise stable since arrival to unit.     SUBJECTIVE/ROS:  [ ] A ten-point review of systems was otherwise negative except as noted.  [x] Due to altered mental status/intubation, subjective information were not able to be obtained from the patient. History was obtained, to the extent possible, from review of the chart and collateral sources of information.      NEURO  RASS: 0      Exam: follows commands   Meds: acetaminophen  IVPB. 1000 milliGRAM(s) IV Intermittent once  acetaminophen  IVPB. 1000 milliGRAM(s) IV Intermittent once  acetaminophen  IVPB. 1000 milliGRAM(s) IV Intermittent once  propofol Infusion 15 MICROgram(s)/kG/Min IV Continuous <Continuous>    [x] Adequacy of sedation and pain control has been assessed and adjusted      RESPIRATORY  RR: 17 (01-10-18 @ 00:00) (12 - 30)  SpO2: 100% (01-10-18 @ 00:00) (97% - 100%)  Wt(kg): --  Exam: unlabored, clear to auscultation bilaterally  Mechanical Ventilation: Mode: AC/ CMV (Assist Control/ Continuous Mandatory Ventilation), RR (machine): 14, RR (patient): 20, TV (machine): 450, FiO2: 30, PEEP: 5, ITime: 1, MAP: 15, PIP: 35    [ ] Extubation Readiness Assessed  Meds:       CARDIOVASCULAR  HR: 80 (01-10-18 @ 00:00) (70 - 120)  BP: 106/73 (01-10-18 @ 00:00) (98/64 - 139/109)  BP(mean): 85 (01-10-18 @ 00:00) (76 - 110)  ABP: --  ABP(mean): --  Wt(kg): --  CVP(cm H2O): --  VBG - ( 09 Jan 2018 20:02 )  pH: 7.34  /  pCO2: 41    /  pO2: 43    / HCO3: 21    / Base Excess: -3.7  /  SaO2: 65     Lactate: 5.9          Exam:  Cardiac Rhythm:  Perfusion     [xAdequate   [ ]Inadequate  Mentation   [ ]Normal       [xReduced  Extremities  [xWarm         [ ]Cool  Volume Status [ ]Hypervolemic [xEuvolemic [ ]Hypovolemic  Meds:       GI/NUTRITION  Exam: soft, nt, nd  Diet: NPO  Meds:     GENITOURINARY  I&O's Detail    01-09 @ 07:01  -  01-10 @ 01:28  --------------------------------------------------------  IN:    IV PiggyBack: 100 mL    lactated ringers.: 75 mL    propofol Infusion: 17.5 mL  Total IN: 192.5 mL    OUT:    Indwelling Catheter - Urethral: 1100 mL  Total OUT: 1100 mL    Total NET: -907.5 mL        Weight (kg): 58.4 (01-09 @ 23:00)  01-09    141  |  100  |  6<L>  ----------------------------<  104<H>  3.7   |  23  |  0.59    Ca    8.6      09 Jan 2018 18:10    TPro  8.8<H>  /  Alb  4.4  /  TBili  0.3  /  DBili  x   /  AST  132<H>  /  ALT  67<H>  /  AlkPhos  78  01-09    [ ] Lopez catheter, indication: N/A  Meds: lactated ringers. 1000 milliLiter(s) IV Continuous <Continuous>        HEMATOLOGIC  Meds: enoxaparin Injectable 40 milliGRAM(s) SubCutaneous daily    [x] VTE Prophylaxis                        11.6   4.4   )-----------( 223      ( 09 Jan 2018 18:10 )             36.4       Transfusion     [ ] PRBC   [ ] Platelets   [ ] FFP   [ ] Cryoprecipitate      INFECTIOUS DISEASES  T(C): 36.4 (01-09-18 @ 22:00), Max: 36.9 (01-09-18 @ 18:39)  Wt(kg): --  WBC Count: 4.4 K/uL (01-09 @ 18:10)    Recent Cultures:    Meds: clindamycin IVPB 600 milliGRAM(s) IV Intermittent every 8 hours  levoFLOXacin IVPB 500 milliGRAM(s) IV Intermittent every 24 hours        ENDOCRINE  Capillary Blood Glucose    Meds: dexamethasone  IVPB 10 milliGRAM(s) IV Intermittent every 6 hours  insulin lispro (HumaLOG) corrective regimen sliding scale   SubCutaneous every 6 hours  levothyroxine Injectable 25 MICROGram(s) IV Push at bedtime        ACCESS DEVICES:  [x Peripheral IV  [ ] Central Venous Line	[ ] R	[ ] L	[ ] IJ	[ ] Fem	[ ] SC	Placed:   [ ] Arterial Line		[ ] R	[ ] L	[ ] Fem	[ ] Rad	[ ] Ax	Placed:   [ ] PICC:					[ ] Mediport  [ ] Urinary Catheter, Date Placed:   [ ] Necessity of urinary, arterial, and venous catheters discussed    OTHER MEDICATIONS:      CODE STATUS: full code     IMAGING: HISTORY  42F with PMH of autoimmune hepatitis, psoriasis, hypothyroidism, pineal gland cyst presented to ED with complaints of cough, congestion and sore throat x few weeks, which have been worsening in the past four days with new associated throat tightness, shortness of breath and dysphagia with solids and liquids. Pt reports she was in [place] for last week and began having progressively worsening difficulty with swallowing solids and liquids and SOB at rest 4 days ago. She went to an urgent care center today and got duonebs, then was referred to the ED. No hx of asthma or intubations. States had all childhood vaccinations.   ED: Patient received IV decadron 10mg, IV valium 2mg, racemic epi, IV tylenol, and IV Clinda 600mg. Patient evaluated by ENT in ED via FOE/laryngoscopy and noted to have supraglottitis/epiglottitis. In ED patient initially noted to be stridorous and tachypneic to 30s, saturating 100% on RA without posturing, drooling, and no acute respiratory distress however, began to acutely worsen and was taken to the OR for intubation by ENT.   SICU consulted for continued airway monitoring post OR intubation     24 HOUR EVENTS: Patient taken to OR by ENT for emergent intubation for supra/epiglottitis. Brought to SICU for continued monitoring. Patient mildly agitated given 1mg ativan. Otherwise stable since arrival to unit.     SUBJECTIVE/ROS:  [ ] A ten-point review of systems was otherwise negative except as noted.  [x] Due to altered mental status/intubation, subjective information were not able to be obtained from the patient. History was obtained, to the extent possible, from review of the chart and collateral sources of information.      NEURO  RASS: 0      Exam: follows commands   Meds: acetaminophen  IVPB. 1000 milliGRAM(s) IV Intermittent once  acetaminophen  IVPB. 1000 milliGRAM(s) IV Intermittent once  acetaminophen  IVPB. 1000 milliGRAM(s) IV Intermittent once  propofol Infusion 15 MICROgram(s)/kG/Min IV Continuous <Continuous>    [x] Adequacy of sedation and pain control has been assessed and adjusted      RESPIRATORY  RR: 17 (01-10-18 @ 00:00) (12 - 30)  SpO2: 100% (01-10-18 @ 00:00) (97% - 100%)  Wt(kg): --  Exam: unlabored, clear to auscultation bilaterally  Mechanical Ventilation: Mode: AC/ CMV (Assist Control/ Continuous Mandatory Ventilation), RR (machine): 14, RR (patient): 20, TV (machine): 450, FiO2: 30, PEEP: 5, ITime: 1, MAP: 15, PIP: 35    [ ] Extubation Readiness Assessed  Meds:       CARDIOVASCULAR  HR: 80 (01-10-18 @ 00:00) (70 - 120)  BP: 106/73 (01-10-18 @ 00:00) (98/64 - 139/109)  BP(mean): 85 (01-10-18 @ 00:00) (76 - 110)  ABP: --  ABP(mean): --  Wt(kg): --  CVP(cm H2O): --  VBG - ( 09 Jan 2018 20:02 )  pH: 7.34  /  pCO2: 41    /  pO2: 43    / HCO3: 21    / Base Excess: -3.7  /  SaO2: 65     Lactate: 5.9          Exam:  Cardiac Rhythm:  Perfusion     [xAdequate   [ ]Inadequate  Mentation   [ ]Normal       [xReduced  Extremities  [xWarm         [ ]Cool  Volume Status [ ]Hypervolemic [xEuvolemic [ ]Hypovolemic  Meds:       GI/NUTRITION  Exam: soft, nt, nd  Diet: NPO  Meds:     GENITOURINARY  I&O's Detail    01-09 @ 07:01  -  01-10 @ 01:28  --------------------------------------------------------  IN:    IV PiggyBack: 100 mL    lactated ringers.: 75 mL    propofol Infusion: 17.5 mL  Total IN: 192.5 mL    OUT:    Indwelling Catheter - Urethral: 1100 mL  Total OUT: 1100 mL    Total NET: -907.5 mL        Weight (kg): 58.4 (01-09 @ 23:00)  01-09    141  |  100  |  6<L>  ----------------------------<  104<H>  3.7   |  23  |  0.59    Ca    8.6      09 Jan 2018 18:10    TPro  8.8<H>  /  Alb  4.4  /  TBili  0.3  /  DBili  x   /  AST  132<H>  /  ALT  67<H>  /  AlkPhos  78  01-09    [ ] Lopez catheter, indication: N/A  Meds: lactated ringers. 1000 milliLiter(s) IV Continuous <Continuous>        HEMATOLOGIC  Meds: enoxaparin Injectable 40 milliGRAM(s) SubCutaneous daily    [x] VTE Prophylaxis                        11.6   4.4   )-----------( 223      ( 09 Jan 2018 18:10 )             36.4       Transfusion     [ ] PRBC   [ ] Platelets   [ ] FFP   [ ] Cryoprecipitate      INFECTIOUS DISEASES  T(C): 36.4 (01-09-18 @ 22:00), Max: 36.9 (01-09-18 @ 18:39)  Wt(kg): --  WBC Count: 4.4 K/uL (01-09 @ 18:10)    Recent Cultures:    Meds: clindamycin IVPB 600 milliGRAM(s) IV Intermittent every 8 hours  levoFLOXacin IVPB 500 milliGRAM(s) IV Intermittent every 24 hours        ENDOCRINE  Capillary Blood Glucose    Meds: dexamethasone  IVPB 10 milliGRAM(s) IV Intermittent every 6 hours  insulin lispro (HumaLOG) corrective regimen sliding scale   SubCutaneous every 6 hours  levothyroxine Injectable 25 MICROGram(s) IV Push at bedtime        ACCESS DEVICES:  [x Peripheral IV  [ ] Central Venous Line	[ ] R	[ ] L	[ ] IJ	[ ] Fem	[ ] SC	Placed:   [ ] Arterial Line		[ ] R	[ ] L	[ ] Fem	[ ] Rad	[ ] Ax	Placed:   [ ] PICC:					[ ] Mediport  [ ] Urinary Catheter, Date Placed:   [ ] Necessity of urinary, arterial, and venous catheters discussed    OTHER MEDICATIONS:      CODE STATUS: full code     IMAGING:

## 2018-01-10 NOTE — CHART NOTE - NSCHARTNOTEFT_GEN_A_CORE
Bedside indirect laryngoscopy performed 1hr after extubation: Vocal fold erythema, B/L VC mobile and intact, airway patent, no epiglottic, arytenoid, or vocal fold edema, airway patent, no masses/lesions. Taper steroids Bedside indirect laryngoscopy performed 1hr after extubation: Vocal fold erythema, B/L VC mobile and intact, airway patent, no epiglottic, arytenoid, or vocal fold edema, airway patent, no masses/lesions. Anti reflux meds, taper steroids.

## 2018-01-11 LAB
ANION GAP SERPL CALC-SCNC: 12 MMOL/L — SIGNIFICANT CHANGE UP (ref 5–17)
ANION GAP SERPL CALC-SCNC: 12 MMOL/L — SIGNIFICANT CHANGE UP (ref 5–17)
APTT BLD: 25.2 SEC — LOW (ref 27.5–37.4)
BUN SERPL-MCNC: 7 MG/DL — SIGNIFICANT CHANGE UP (ref 7–23)
BUN SERPL-MCNC: 8 MG/DL — SIGNIFICANT CHANGE UP (ref 7–23)
CA-I BLD-SCNC: 1.12 MMOL/L — SIGNIFICANT CHANGE UP (ref 1.12–1.3)
CALCIUM SERPL-MCNC: 8.4 MG/DL — SIGNIFICANT CHANGE UP (ref 8.4–10.5)
CALCIUM SERPL-MCNC: 8.9 MG/DL — SIGNIFICANT CHANGE UP (ref 8.4–10.5)
CHLORIDE SERPL-SCNC: 100 MMOL/L — SIGNIFICANT CHANGE UP (ref 96–108)
CHLORIDE SERPL-SCNC: 102 MMOL/L — SIGNIFICANT CHANGE UP (ref 96–108)
CK MB CFR SERPL CALC: 1 NG/ML — SIGNIFICANT CHANGE UP (ref 0–3.8)
CK SERPL-CCNC: 71 U/L — SIGNIFICANT CHANGE UP (ref 25–170)
CO2 SERPL-SCNC: 24 MMOL/L — SIGNIFICANT CHANGE UP (ref 22–31)
CO2 SERPL-SCNC: 25 MMOL/L — SIGNIFICANT CHANGE UP (ref 22–31)
CREAT SERPL-MCNC: 0.52 MG/DL — SIGNIFICANT CHANGE UP (ref 0.5–1.3)
CREAT SERPL-MCNC: 0.58 MG/DL — SIGNIFICANT CHANGE UP (ref 0.5–1.3)
GAS PNL BLDA: SIGNIFICANT CHANGE UP
GLUCOSE BLDC GLUCOMTR-MCNC: 150 MG/DL — HIGH (ref 70–99)
GLUCOSE BLDC GLUCOMTR-MCNC: 198 MG/DL — HIGH (ref 70–99)
GLUCOSE BLDC GLUCOMTR-MCNC: 213 MG/DL — HIGH (ref 70–99)
GLUCOSE SERPL-MCNC: 185 MG/DL — HIGH (ref 70–99)
GLUCOSE SERPL-MCNC: 208 MG/DL — HIGH (ref 70–99)
HCT VFR BLD CALC: 30.2 % — LOW (ref 34.5–45)
HGB BLD-MCNC: 10.2 G/DL — LOW (ref 11.5–15.5)
INR BLD: 1.14 RATIO — SIGNIFICANT CHANGE UP (ref 0.88–1.16)
MAGNESIUM SERPL-MCNC: 1.8 MG/DL — SIGNIFICANT CHANGE UP (ref 1.6–2.6)
MAGNESIUM SERPL-MCNC: 2.2 MG/DL — SIGNIFICANT CHANGE UP (ref 1.6–2.6)
MCHC RBC-ENTMCNC: 29.6 PG — SIGNIFICANT CHANGE UP (ref 27–34)
MCHC RBC-ENTMCNC: 33.7 GM/DL — SIGNIFICANT CHANGE UP (ref 32–36)
MCV RBC AUTO: 87.8 FL — SIGNIFICANT CHANGE UP (ref 80–100)
PHOSPHATE SERPL-MCNC: 1.2 MG/DL — LOW (ref 2.5–4.5)
PHOSPHATE SERPL-MCNC: 1.4 MG/DL — LOW (ref 2.5–4.5)
PLATELET # BLD AUTO: 155 K/UL — SIGNIFICANT CHANGE UP (ref 150–400)
POTASSIUM SERPL-MCNC: 3.6 MMOL/L — SIGNIFICANT CHANGE UP (ref 3.5–5.3)
POTASSIUM SERPL-MCNC: 3.7 MMOL/L — SIGNIFICANT CHANGE UP (ref 3.5–5.3)
POTASSIUM SERPL-SCNC: 3.6 MMOL/L — SIGNIFICANT CHANGE UP (ref 3.5–5.3)
POTASSIUM SERPL-SCNC: 3.7 MMOL/L — SIGNIFICANT CHANGE UP (ref 3.5–5.3)
PROTHROM AB SERPL-ACNC: 12.5 SEC — SIGNIFICANT CHANGE UP (ref 9.8–12.7)
RBC # BLD: 3.43 M/UL — LOW (ref 3.8–5.2)
RBC # FLD: 15.8 % — HIGH (ref 10.3–14.5)
SODIUM SERPL-SCNC: 136 MMOL/L — SIGNIFICANT CHANGE UP (ref 135–145)
SODIUM SERPL-SCNC: 139 MMOL/L — SIGNIFICANT CHANGE UP (ref 135–145)
TROPONIN T SERPL-MCNC: <0.01 NG/ML — SIGNIFICANT CHANGE UP (ref 0–0.06)
WBC # BLD: 6.5 K/UL — SIGNIFICANT CHANGE UP (ref 3.8–10.5)
WBC # FLD AUTO: 6.5 K/UL — SIGNIFICANT CHANGE UP (ref 3.8–10.5)

## 2018-01-11 PROCEDURE — 71045 X-RAY EXAM CHEST 1 VIEW: CPT | Mod: 26

## 2018-01-11 PROCEDURE — 99233 SBSQ HOSP IP/OBS HIGH 50: CPT

## 2018-01-11 PROCEDURE — 99231 SBSQ HOSP IP/OBS SF/LOW 25: CPT

## 2018-01-11 RX ORDER — ALPRAZOLAM 0.25 MG
0.5 TABLET ORAL ONCE
Qty: 0 | Refills: 0 | Status: DISCONTINUED | OUTPATIENT
Start: 2018-01-11 | End: 2018-01-11

## 2018-01-11 RX ORDER — INSULIN LISPRO 100/ML
VIAL (ML) SUBCUTANEOUS AT BEDTIME
Qty: 0 | Refills: 0 | Status: DISCONTINUED | OUTPATIENT
Start: 2018-01-11 | End: 2018-01-12

## 2018-01-11 RX ORDER — LEVOTHYROXINE SODIUM 125 MCG
50 TABLET ORAL DAILY
Qty: 0 | Refills: 0 | Status: DISCONTINUED | OUTPATIENT
Start: 2018-01-11 | End: 2018-01-12

## 2018-01-11 RX ORDER — IPRATROPIUM/ALBUTEROL SULFATE 18-103MCG
3 AEROSOL WITH ADAPTER (GRAM) INHALATION EVERY 6 HOURS
Qty: 0 | Refills: 0 | Status: DISCONTINUED | OUTPATIENT
Start: 2018-01-11 | End: 2018-01-12

## 2018-01-11 RX ORDER — MAGNESIUM SULFATE 500 MG/ML
2 VIAL (ML) INJECTION ONCE
Qty: 0 | Refills: 0 | Status: COMPLETED | OUTPATIENT
Start: 2018-01-11 | End: 2018-01-11

## 2018-01-11 RX ORDER — INSULIN LISPRO 100/ML
VIAL (ML) SUBCUTANEOUS
Qty: 0 | Refills: 0 | Status: DISCONTINUED | OUTPATIENT
Start: 2018-01-11 | End: 2018-01-12

## 2018-01-11 RX ORDER — POTASSIUM PHOSPHATE, MONOBASIC POTASSIUM PHOSPHATE, DIBASIC 236; 224 MG/ML; MG/ML
15 INJECTION, SOLUTION INTRAVENOUS ONCE
Qty: 0 | Refills: 0 | Status: COMPLETED | OUTPATIENT
Start: 2018-01-11 | End: 2018-01-11

## 2018-01-11 RX ORDER — DEXAMETHASONE 0.5 MG/5ML
ELIXIR ORAL
Qty: 0 | Refills: 0 | Status: DISCONTINUED | OUTPATIENT
Start: 2018-01-11 | End: 2018-01-12

## 2018-01-11 RX ORDER — LIOTHYRONINE SODIUM 25 UG/1
5 TABLET ORAL DAILY
Qty: 0 | Refills: 0 | Status: DISCONTINUED | OUTPATIENT
Start: 2018-01-11 | End: 2018-01-12

## 2018-01-11 RX ORDER — POTASSIUM CHLORIDE 20 MEQ
10 PACKET (EA) ORAL
Qty: 0 | Refills: 0 | Status: COMPLETED | OUTPATIENT
Start: 2018-01-11 | End: 2018-01-11

## 2018-01-11 RX ORDER — DEXAMETHASONE 0.5 MG/5ML
10 ELIXIR ORAL EVERY 6 HOURS
Qty: 0 | Refills: 0 | Status: DISCONTINUED | OUTPATIENT
Start: 2018-01-11 | End: 2018-01-12

## 2018-01-11 RX ADMIN — Medication 125 MILLIMOLE(S): at 09:39

## 2018-01-11 RX ADMIN — Medication 3 MILLILITER(S): at 23:18

## 2018-01-11 RX ADMIN — Medication 3 MILLILITER(S): at 06:53

## 2018-01-11 RX ADMIN — Medication 100 MILLIEQUIVALENT(S): at 06:18

## 2018-01-11 RX ADMIN — POTASSIUM PHOSPHATE, MONOBASIC POTASSIUM PHOSPHATE, DIBASIC 62.5 MILLIMOLE(S): 236; 224 INJECTION, SOLUTION INTRAVENOUS at 17:11

## 2018-01-11 RX ADMIN — Medication 250 MILLIMOLE(S): at 22:14

## 2018-01-11 RX ADMIN — Medication 250 MILLIMOLE(S): at 17:11

## 2018-01-11 RX ADMIN — Medication 100 MILLIGRAM(S): at 01:12

## 2018-01-11 RX ADMIN — Medication 100 MILLIEQUIVALENT(S): at 05:20

## 2018-01-11 RX ADMIN — Medication 3 MILLILITER(S): at 12:02

## 2018-01-11 RX ADMIN — Medication 50 GRAM(S): at 05:29

## 2018-01-11 RX ADMIN — ENOXAPARIN SODIUM 40 MILLIGRAM(S): 100 INJECTION SUBCUTANEOUS at 12:36

## 2018-01-11 RX ADMIN — Medication 102 MILLIGRAM(S): at 05:08

## 2018-01-11 RX ADMIN — Medication 125 MILLIMOLE(S): at 05:54

## 2018-01-11 RX ADMIN — PANTOPRAZOLE SODIUM 40 MILLIGRAM(S): 20 TABLET, DELAYED RELEASE ORAL at 12:34

## 2018-01-11 RX ADMIN — Medication 2: at 12:42

## 2018-01-11 RX ADMIN — Medication 100 MILLIGRAM(S): at 17:18

## 2018-01-11 RX ADMIN — Medication 100 MILLIGRAM(S): at 12:35

## 2018-01-11 RX ADMIN — Medication 4: at 05:54

## 2018-01-11 RX ADMIN — Medication 2: at 17:19

## 2018-01-11 RX ADMIN — Medication 3 MILLILITER(S): at 16:32

## 2018-01-11 RX ADMIN — Medication 25 MICROGRAM(S): at 05:22

## 2018-01-11 RX ADMIN — Medication 102 MILLIGRAM(S): at 17:24

## 2018-01-11 RX ADMIN — Medication 125 MILLIMOLE(S): at 11:39

## 2018-01-11 RX ADMIN — Medication 102 MILLIGRAM(S): at 12:33

## 2018-01-11 NOTE — PROGRESS NOTE ADULT - ATTENDING COMMENTS
Pt seen and examined, agree with above. Pt still complaining of intermittent throat tightness with difficulty breathing. No stridor, no increased work of breathing or hypoxia. However, given recent need for emergent intubation, will continue to monitor in SICU as taper steroids. Etiology of airway compromise unclear as ENT evaluation did not demonstrate epiglottitis or any inflammation/ edema of the airway (per my discussion with  yesterday after scope, before extubation). Pt had been on steroids and antibiotics for less than 24 hours at that time. Hypophosphatemia: replete and recheck. Influenza: supportive care.

## 2018-01-11 NOTE — DIETITIAN INITIAL EVALUATION ADULT. - FACTORS AFF FOOD INTAKE
difficulty swallowing/Pt extubated yesterday, reports she is eating her first food in 3 days. Pt observed sipping a fruit smoothie, reports she is cautiously eating small portions with some swallowing discomfort. No GI distress noted, no BM in-house.

## 2018-01-11 NOTE — PROGRESS NOTE ADULT - SUBJECTIVE AND OBJECTIVE BOX
HISTORY  42F with PMH of autoimmune hepatitis, psoriasis, hypothyroidism, pineal gland cyst presented to ED with complaints of cough, congestion and sore throat x few weeks, which have been worsening in the past four days with new associated throat tightness, shortness of breath and dysphagia with solids and liquids. Pt reports she was in [place] for last week and began having progressively worsening difficulty with swallowing solids and liquids and SOB at rest 4 days ago. She went to an urgent care center today and got duonebs, then was referred to the ED. No hx of asthma or intubations. States had all childhood vaccinations.   ED: Patient received IV decadron 10mg, IV valium 2mg, racemic epi, IV tylenol, and IV Clinda 600mg. Patient evaluated by ENT in ED via FOE/laryngoscopy and noted to have supraglottitis/epiglottitis. In ED patient initially noted to be stridorous and tachypneic to 30s, saturating 100% on RA without posturing, drooling, and no acute respiratory distress however, began to acutely worsen and was taken to the OR for intubation by ENT.   SICU consulted for continued airway monitoring post OR intubation     24 HOUR EVENTS:  Patient was extubated. HISTORY  42F with PMH of autoimmune hepatitis, psoriasis, hypothyroidism, pineal gland cyst presented to ED with complaints of cough, congestion and sore throat x few weeks, which have been worsening in the past four days with new associated throat tightness, shortness of breath and dysphagia with solids and liquids. Pt reports she was in [place] for last week and began having progressively worsening difficulty with swallowing solids and liquids and SOB at rest 4 days ago. She went to an urgent care center today and got duonebs, then was referred to the ED. No hx of asthma or intubations. States had all childhood vaccinations.   ED: Patient received IV decadron 10mg, IV valium 2mg, racemic epi, IV tylenol, and IV Clinda 600mg. Patient evaluated by ENT in ED via FOE/laryngoscopy and noted to have supraglottitis/epiglottitis. In ED patient initially noted to be stridorous and tachypneic to 30s, saturating 100% on RA without posturing, drooling, and no acute respiratory distress however, began to acutely worsen and was taken to the OR for intubation by ENT.   SICU consulted for continued airway monitoring post OR intubation.    24 HOUR EVENTS:  Patient was extubated. Kept NPO. Lactate cleared to 1.2. Patient did become short of breath overnight with chest pain but EKG normal, cardiac enzymes negative, and improved with Duoneb treatment. Decadron to complete today. ENT scoped patient and no evidence of airway edema found.    SUBJECTIVE/ROS:  [x] A ten-point review of systems was otherwise negative except as noted.  [ ] Due to altered mental status/intubation, subjective information were not able to be obtained from the patient. History was obtained, to the extent possible, from review of the chart and collateral sources of information.    NEURO  CAM ICU: negative  Exam: awake, alert, oriented x4, NAD, no focal deficits  Meds: none  [x] Adequacy of sedation and pain control has been assessed and adjusted    RESPIRATORY  RR: 23 (01-11-18 @ 08:00) (11 - 25)  SpO2: 99% (01-11-18 @ 08:00) (95% - 100%)  Exam: clear to auscultation bilaterally  Mechanical Ventilation: Mode: no  [N/A] Extubation Readiness Assessed  ABG - ( 11 Jan 2018 02:44 )  pH: 7.49  /  pCO2: 34    /  pO2: 184   / HCO3: 26    / Base Excess: 3.2   /  SaO2: 100   /    Lactate: 1.7  Meds: ALBUTerol/ipratropium for Nebulization 3 milliLiter(s) Nebulizer every 6 hours PRN Shortness of Breath    CARDIOVASCULAR  HR: 90 (01-11-18 @ 08:00) (39 - 90)  BP: 122/84 (01-11-18 @ 07:00) (100/60 - 134/78)  BP(mean): 99 (01-11-18 @ 07:00) (75 - 100)  Exam: regular rhythm, S1S2  Cardiac Rhythm: sinus bradycardia  Perfusion    [x]Adequate   [ ]Inadequate  Mentation   [x]Normal       [ ]Reduced  Extremities  [x]Warm         [ ]Cool  Volume Status [ ]Hypervolemic [x]Euvolemic [ ]Hypovolemic  Meds: none    GI/NUTRITION  Exam: soft, nontender, nondistended  Diet: regular  Meds: pantoprazole  Injectable 40 milliGRAM(s) IV Push daily    GENITOURINARY  I&O's Detail    01-10 @ 07:01  -  01-11 @ 07:00  --------------------------------------------------------  IN:    dextrose 5% + sodium chloride 0.45%.: 1500 mL    fentaNYL  Infusion: 14.5 mL    IV PiggyBack: 550 mL    lactated ringers.: 150 mL    propofol Infusion: 35.2 mL    Solution: 100 mL    Solution: 100 mL    Solution: 150 mL    Solution: 250 mL  Total IN: 2849.7 mL    OUT:    Indwelling Catheter - Urethral: 1345 mL    Voided: 400 mL  Total OUT: 1745 mL    Total NET: 1104.7 mL      136  |  100  |  7   ----------------------------<  185<H>  3.6   |  24  |  0.52    Ca    8.4      11 Jan 2018 02:39  Phos  1.4     01-11  Mg     1.8     01-11    [N/A] Lopez catheter, indication:   Meds: none    HEMATOLOGIC  Meds: enoxaparin Injectable 40 milliGRAM(s) SubCutaneous daily  [x] VTE Prophylaxis                        10.2   6.5   )-----------( 155      ( 11 Jan 2018 02:39 )             30.2     PT/INR - ( 11 Jan 2018 02:39 )   PT: 12.5 sec;   INR: 1.14 ratio    PTT - ( 11 Jan 2018 02:39 )  PTT:25.2 sec    INFECTIOUS DISEASES  T(C): 36.7 (01-11-18 @ 07:00), Max: 37.1 (01-11-18 @ 00:00)  WBC Count: 6.5 K/uL (01-11 @ 02:39)  Recent Cultures:  ·	Specimen Source: .Blood Blood, 01-09 @ 22:52; Results No growth to date.; Gram Stain: --; Organism: --  ·	Specimen Source: .Blood Blood, 01-09 @ 22:51; Results No growth to date.; Gram Stain: --; Organism: --  Meds:  ·	clindamycin IVPB 600 milliGRAM(s) IV Intermittent every 8 hours  ·	levoFLOXacin IVPB 500 milliGRAM(s) IV Intermittent every 24 hours    ENDOCRINE  Capillary Blood Glucose:  ·	POCT Blood Glucose.: 213 mg/dL (11 Jan 2018 05:48)  ·	POCT Blood Glucose.: 179 mg/dL (10 Vito 2018 23:37)  ·	POCT Blood Glucose.: 171 mg/dL (10 Vito 2018 17:41)  ·	POCT Blood Glucose.: 204 mg/dL (10 Vito 2018 11:48)  Meds:  ·	dexamethasone  IVPB 10 milliGRAM(s) IV Intermittent every 6 hours  ·	insulin lispro (HumaLOG) corrective regimen sliding scale   SubCutaneous every 6 hours  ·	levothyroxine Injectable 25 MICROGram(s) IV Push at bedtime    ACCESS DEVICES:  [x] Peripheral IV  [ ] Central Venous Line	[ ] R	[ ] L	[ ] IJ	[ ] Fem	[ ] SC	Placed:   [ ] Arterial Line		[ ] R	[ ] L	[ ] Fem	[ ] Rad	[ ] Ax	Placed:   [ ] PICC:					[ ] Mediport  [ ] Urinary Catheter, Date Placed:   [x] Necessity of urinary, arterial, and venous catheters discussed    OTHER MEDICATIONS: none    CODE STATUS: Full code.    IMAGING:

## 2018-01-11 NOTE — DIETITIAN INITIAL EVALUATION ADULT. - ORAL INTAKE PTA
Pt follows a Regular diet, reports no nutrition issues. Pt takes no nutrition supplements; reports NKFA./good

## 2018-01-11 NOTE — PROGRESS NOTE ADULT - SUBJECTIVE AND OBJECTIVE BOX
POD/STATUS POST/ENT ISSUE:     INTERVAL HPI:  Patient is a 42y old  Female w PMHx psoriasis, hypothyroidism, pineal gland cyst presents to [facility] ED complaining of cough, congestion and sore throat x few weeks, with worseneing throat tightness and dyspnea in the past four days. Pt states she was being treated for a sinus infection a few weeks ago with augmentin in [facility] pts LFTs were found to be around 1000, had liver biopsy done and both augmentin and humira for psoriasis Dc'd. Pt with persistent sore throat since and began to feel throat tightness 4 days ago in [place]. Pt states she had pain with swallowing solids and liquids, fevers, cough and nasal congestion. Pt underwent indirect laryngoscopy at bedside and was found to have laryngeal edema on 1/11 night. The true cords weren't visualized. Pt was taken to the OR for emergent intubation. Pt was extubated on 1/11. Pt is feeling much better this AM. Breathing, sore throat, hoarseness have improved. No fevers    Vital Signs Last 24 Hrs  T(C): 36.8 (11 Jan 2018 04:00), Max: 37.1 (11 Jan 2018 00:00)  T(F): 98.2 (11 Jan 2018 04:00), Max: 98.7 (11 Jan 2018 00:00)  HR: 74 (11 Jan 2018 07:00) (39 - 74)  BP: 122/84 (11 Jan 2018 07:00) (100/60 - 134/78)  BP(mean): 99 (11 Jan 2018 07:00) (75 - 100)  RR: 21 (11 Jan 2018 07:00) (11 - 25)  SpO2: 98% (11 Jan 2018 07:00) (95% - 100%)    PHYSICAL EXAM:  Gen: NAD, well-developed  Head: Normocephalic, Atraumatic  Eyes: PERRL, EOMI, no scleral injection  Nose: Nares bilaterally patent, no discharge  Mouth: Mucosa moist, tongue/uvula midline, oropharynx clear  Neck: Flat, supple, no lymphadenopathy, trachea midline, no masses  Resp:  no stridor  CV: no peripheral edema/cyanosis    LABS:                        10.2   6.5   )-----------( 155      ( 11 Jan 2018 02:39 )             30.2

## 2018-01-11 NOTE — DIETITIAN INITIAL EVALUATION ADULT. - OTHER INFO
Nutrition Assessment warranted for length of stay in SICU. Per chart, pt is a "42 year old female with PMHx of autoimmune hepatitis, psoriasis, hypothyroidism, pineal gland cyst presenting with supraglottitis/epiglottitis requiring emergent intubation for respiratory distress, now extubated."

## 2018-01-11 NOTE — DIETITIAN INITIAL EVALUATION ADULT. - ENERGY NEEDS
ht: 5 feet 7 inches, wt: 128 pounds, BMI: 20.2 Kg/m2, IBW: 135 pounds (+/- 10%), 95% IBW. Edema: none noted; Skin: no pressure injuries.

## 2018-01-12 ENCOUNTER — TRANSCRIPTION ENCOUNTER (OUTPATIENT)
Age: 43
End: 2018-01-12

## 2018-01-12 VITALS — OXYGEN SATURATION: 100 %

## 2018-01-12 LAB
ANION GAP SERPL CALC-SCNC: 11 MMOL/L — SIGNIFICANT CHANGE UP (ref 5–17)
APTT BLD: 24.6 SEC — LOW (ref 27.5–37.4)
BUN SERPL-MCNC: 8 MG/DL — SIGNIFICANT CHANGE UP (ref 7–23)
CA-I BLD-SCNC: 1.16 MMOL/L — SIGNIFICANT CHANGE UP (ref 1.12–1.3)
CALCIUM SERPL-MCNC: 8.7 MG/DL — SIGNIFICANT CHANGE UP (ref 8.4–10.5)
CHLORIDE SERPL-SCNC: 101 MMOL/L — SIGNIFICANT CHANGE UP (ref 96–108)
CO2 SERPL-SCNC: 25 MMOL/L — SIGNIFICANT CHANGE UP (ref 22–31)
CREAT SERPL-MCNC: 0.55 MG/DL — SIGNIFICANT CHANGE UP (ref 0.5–1.3)
GLUCOSE BLDC GLUCOMTR-MCNC: 180 MG/DL — HIGH (ref 70–99)
GLUCOSE BLDC GLUCOMTR-MCNC: 199 MG/DL — HIGH (ref 70–99)
GLUCOSE SERPL-MCNC: 183 MG/DL — HIGH (ref 70–99)
HBA1C BLD-MCNC: 4.8 % — SIGNIFICANT CHANGE UP (ref 4–5.6)
HCT VFR BLD CALC: 30.2 % — LOW (ref 34.5–45)
HGB BLD-MCNC: 9.9 G/DL — LOW (ref 11.5–15.5)
INR BLD: 1.13 RATIO — SIGNIFICANT CHANGE UP (ref 0.88–1.16)
MAGNESIUM SERPL-MCNC: 2 MG/DL — SIGNIFICANT CHANGE UP (ref 1.6–2.6)
MCHC RBC-ENTMCNC: 28.6 PG — SIGNIFICANT CHANGE UP (ref 27–34)
MCHC RBC-ENTMCNC: 32.9 GM/DL — SIGNIFICANT CHANGE UP (ref 32–36)
MCV RBC AUTO: 87 FL — SIGNIFICANT CHANGE UP (ref 80–100)
PHOSPHATE SERPL-MCNC: 3.8 MG/DL — SIGNIFICANT CHANGE UP (ref 2.5–4.5)
PLATELET # BLD AUTO: 158 K/UL — SIGNIFICANT CHANGE UP (ref 150–400)
POTASSIUM SERPL-MCNC: 3.6 MMOL/L — SIGNIFICANT CHANGE UP (ref 3.5–5.3)
POTASSIUM SERPL-SCNC: 3.6 MMOL/L — SIGNIFICANT CHANGE UP (ref 3.5–5.3)
PROTHROM AB SERPL-ACNC: 12.4 SEC — SIGNIFICANT CHANGE UP (ref 9.8–12.7)
RBC # BLD: 3.47 M/UL — LOW (ref 3.8–5.2)
RBC # FLD: 16.3 % — HIGH (ref 10.3–14.5)
SODIUM SERPL-SCNC: 137 MMOL/L — SIGNIFICANT CHANGE UP (ref 135–145)
WBC # BLD: 5.9 K/UL — SIGNIFICANT CHANGE UP (ref 3.8–10.5)
WBC # FLD AUTO: 5.9 K/UL — SIGNIFICANT CHANGE UP (ref 3.8–10.5)

## 2018-01-12 PROCEDURE — 82550 ASSAY OF CK (CPK): CPT

## 2018-01-12 PROCEDURE — 84295 ASSAY OF SERUM SODIUM: CPT

## 2018-01-12 PROCEDURE — 94002 VENT MGMT INPAT INIT DAY: CPT

## 2018-01-12 PROCEDURE — 71045 X-RAY EXAM CHEST 1 VIEW: CPT

## 2018-01-12 PROCEDURE — 87486 CHLMYD PNEUM DNA AMP PROBE: CPT

## 2018-01-12 PROCEDURE — 83036 HEMOGLOBIN GLYCOSYLATED A1C: CPT

## 2018-01-12 PROCEDURE — 87798 DETECT AGENT NOS DNA AMP: CPT

## 2018-01-12 PROCEDURE — 84702 CHORIONIC GONADOTROPIN TEST: CPT

## 2018-01-12 PROCEDURE — 82962 GLUCOSE BLOOD TEST: CPT

## 2018-01-12 PROCEDURE — 83605 ASSAY OF LACTIC ACID: CPT

## 2018-01-12 PROCEDURE — 84132 ASSAY OF SERUM POTASSIUM: CPT

## 2018-01-12 PROCEDURE — 96374 THER/PROPH/DIAG INJ IV PUSH: CPT

## 2018-01-12 PROCEDURE — 94640 AIRWAY INHALATION TREATMENT: CPT

## 2018-01-12 PROCEDURE — 87633 RESP VIRUS 12-25 TARGETS: CPT

## 2018-01-12 PROCEDURE — 85730 THROMBOPLASTIN TIME PARTIAL: CPT

## 2018-01-12 PROCEDURE — 84100 ASSAY OF PHOSPHORUS: CPT

## 2018-01-12 PROCEDURE — 36600 WITHDRAWAL OF ARTERIAL BLOOD: CPT

## 2018-01-12 PROCEDURE — 82947 ASSAY GLUCOSE BLOOD QUANT: CPT

## 2018-01-12 PROCEDURE — 87581 M.PNEUMON DNA AMP PROBE: CPT

## 2018-01-12 PROCEDURE — 93005 ELECTROCARDIOGRAM TRACING: CPT

## 2018-01-12 PROCEDURE — 96375 TX/PRO/DX INJ NEW DRUG ADDON: CPT

## 2018-01-12 PROCEDURE — 85027 COMPLETE CBC AUTOMATED: CPT

## 2018-01-12 PROCEDURE — 82330 ASSAY OF CALCIUM: CPT

## 2018-01-12 PROCEDURE — 85610 PROTHROMBIN TIME: CPT

## 2018-01-12 PROCEDURE — 83735 ASSAY OF MAGNESIUM: CPT

## 2018-01-12 PROCEDURE — 94003 VENT MGMT INPAT SUBQ DAY: CPT

## 2018-01-12 PROCEDURE — 80048 BASIC METABOLIC PNL TOTAL CA: CPT

## 2018-01-12 PROCEDURE — 82435 ASSAY OF BLOOD CHLORIDE: CPT

## 2018-01-12 PROCEDURE — 82553 CREATINE MB FRACTION: CPT

## 2018-01-12 PROCEDURE — 80053 COMPREHEN METABOLIC PANEL: CPT

## 2018-01-12 PROCEDURE — 87040 BLOOD CULTURE FOR BACTERIA: CPT

## 2018-01-12 PROCEDURE — 99285 EMERGENCY DEPT VISIT HI MDM: CPT | Mod: 25

## 2018-01-12 PROCEDURE — 84484 ASSAY OF TROPONIN QUANT: CPT

## 2018-01-12 PROCEDURE — 82803 BLOOD GASES ANY COMBINATION: CPT

## 2018-01-12 PROCEDURE — 82565 ASSAY OF CREATININE: CPT

## 2018-01-12 PROCEDURE — 85014 HEMATOCRIT: CPT

## 2018-01-12 RX ORDER — POTASSIUM CHLORIDE 20 MEQ
20 PACKET (EA) ORAL
Qty: 0 | Refills: 0 | Status: COMPLETED | OUTPATIENT
Start: 2018-01-12 | End: 2018-01-12

## 2018-01-12 RX ADMIN — Medication 1: at 14:34

## 2018-01-12 RX ADMIN — Medication 100 MILLIGRAM(S): at 01:46

## 2018-01-12 RX ADMIN — Medication 100 MILLIGRAM(S): at 09:19

## 2018-01-12 RX ADMIN — LIOTHYRONINE SODIUM 5 MICROGRAM(S): 25 TABLET ORAL at 05:14

## 2018-01-12 RX ADMIN — Medication 50 MICROGRAM(S): at 05:14

## 2018-01-12 RX ADMIN — Medication 3 MILLILITER(S): at 11:33

## 2018-01-12 RX ADMIN — ENOXAPARIN SODIUM 40 MILLIGRAM(S): 100 INJECTION SUBCUTANEOUS at 14:30

## 2018-01-12 RX ADMIN — Medication 102 MILLIGRAM(S): at 05:13

## 2018-01-12 RX ADMIN — Medication 102 MILLIGRAM(S): at 00:34

## 2018-01-12 RX ADMIN — Medication 20 MILLIEQUIVALENT(S): at 04:29

## 2018-01-12 RX ADMIN — Medication 20 MILLIEQUIVALENT(S): at 06:15

## 2018-01-12 RX ADMIN — Medication 1: at 09:20

## 2018-01-12 RX ADMIN — Medication 3 MILLILITER(S): at 06:00

## 2018-01-12 NOTE — DISCHARGE NOTE ADULT - PATIENT PORTAL LINK FT
“You can access the [employer] Patient Portal, offered by [facility], by registering with the following website: [email or web address]”

## 2018-01-12 NOTE — PROGRESS NOTE ADULT - SUBJECTIVE AND OBJECTIVE BOX
POD/STATUS POST/ENT ISSUE:  Patient is a 42y old  Female w PMHx psoriasis, hypothyroidism, pineal gland cyst presents to [facility] ED complaining of cough, congestion and sore throat x few weeks, with worseneing throat tightness and dyspnea in the past four days. Pt states she was being treated for a sinus infection a few weeks ago with augmentin in [facility] pts LFTs were found to be around 1000, had liver biopsy done and both augmentin and humira for psoriasis Dc'd. Pt with persistent sore throat since and began to feel throat tightness 4 days ago in [place]. Pt states she had pain with swallowing solids and liquids, fevers, cough and nasal congestion. Pt underwent indirect laryngoscopy at bedside and was found to have laryngeal edema on 1/11 night. The true cords weren't visualized. Pt was taken to the OR for emergent intubation. Pt was extubated on 1/11. Pt is feeling much better this AM. Pt admits to mild sore throat, no SOB, hoarseness, No fevers        INTERVAL HPI:     Vital Signs Last 24 Hrs  T(C): 36.9 (12 Jan 2018 04:00), Max: 36.9 (11 Jan 2018 19:00)  T(F): 98.4 (12 Jan 2018 04:00), Max: 98.4 (11 Jan 2018 19:00)  HR: 54 (12 Jan 2018 08:00) (46 - 111)  BP: 115/67 (12 Jan 2018 07:00) (90/54 - 132/71)  BP(mean): 87 (12 Jan 2018 07:00) (68 - 98)  RR: 26 (12 Jan 2018 08:00) (15 - 40)  SpO2: 96% (12 Jan 2018 08:00) (94% - 100%)    PHYSICAL EXAM:  Gen: NAD, well-developed  Head: Normocephalic, Atraumatic  Eyes: PERRL, EOMI, no scleral injection  Nose: Nares bilaterally patent, no discharge  Mouth: Mucosa moist, tongue/uvula midline, oropharynx clear  Neck: Flat, supple, no lymphadenopathy, trachea midline, no masses  Resp:  no stridor  CV: no peripheral edema/cyanosis    LABS:                        9.9    5.9   )-----------( 158      ( 12 Jan 2018 02:42 )             30.2

## 2018-01-12 NOTE — PROGRESS NOTE ADULT - ASSESSMENT
41 yo F S/P emergent OR intubation on 1/9/18 for laryngeal edema likely due to angioedema, treated with steroids, found to have a normal larynx and a positive air leak during indirect laryngoscopy. Pt was extubated on 1/10 and is doing well.
41 yo F S/P emergent OR intubation on 1/9/18 for laryngeal edema likely due to angioedema, treated with steroids, found to have a normal larynx and a positive air leak during indirect laryngoscopy. Pt was extubated on 1/10 and is doing well.
42F with PMH of autoimmune hepatitis, psoriasis, hypothyroidism, pineal gland cyst presenting with complaints of cough, congestion, sore throat, throat tightness, shortness of breath and dysphagia evaluated by ENT and noted to have epiglottitis/supraglottitis and emergently intubated in OR for airway protection.     Neuro:  - intubated  - c/w propofol gtt  - IV tylenol for pain     CV: no active issues  -continue to monitor vitals    Resp: epiglottitis s/p intubation  -s/p intubation in OR w/ FOE/laryngoscopy showing airway swelling (epiglottits/supraglottitis)  -c/w decadron 10mg q6h  -c/w Clindamycin 600mg q8h, levaquin 500  -ENT recommendations appreciated     GI: hx of autoimmune hepatitis  -NPO  -LFT elevated, continue to monitor     Renal: No active issues  -monitor I/Os     ID: Epiglottitis, RVP positive influenza   -droplet precaution   -c/w Clindamycin 600mg q8h, levaquin 500 qd    Heme: No active issues  -lovenox 40mg for VTE prophylaxis     Endo: Hypothyroidism  - c/w home synthroid  -monitor serum glucose, ISS    Dispo: Full code. SICU
43 yo F S/P emergent OR intubation on 1/9/18 for laryngeal edema likely due to angioedema, treated with steroids, found to have a normal larynx and a positive air leak during indirect laryngoscopy.
ASSESSMENT:  42 year old female with PMHx of autoimmune hepatitis, psoriasis, hypothyroidism, pineal gland cyst presenting with supraglottitis/epiglottitis requiring emergent intubation for respiratory distress, now extubated.    PLAN:    Neuro: no acute issues  - Monitor mental status.  - Tylenol as needed for pain.    Resp: supraglottitis/epiglottitis  - Monitor pulse oximeter.  - Out of bed to chair, ambulate as tolerated, and incentive spirometry to prevent atelectasis  - Albuterol as needed for shortness of breath.  - Scoped by ENT with no evidence of edema. Appreciate ENT recommendations.    CV: asymptomatic sinus bradycardia  - Monitor vital signs.    GI: no acute issues  - Regular diet as tolerated.    Renal: no acute issues  - Monitor I&Os.  - Monitor electrolytes and replete as necessary.    Heme: no acute issues  - Lovenox for VTE prophylaxis.    ID: supraglottitis/epiglottitis, influenza positive  - Droplet precautions.  - Follow up cultures.  - Monitor for clinical evidence of active infection.  - Clindamycin and Levaquin for supraglottitis/epiglottitis as patient is allergic to penicillins.    Endocrine: supraglottitis/epiglottitis, hypothyroidism, hyperglycemia on steroids  - Decadron 10 mg q6hrs for supraglottitis/epiglottitis. Will discontinue this evening.  - Monitor glucose and ISS q6hrs for glycemic control. Can discontinue when patient is off steroids.  - Continue home Cytomel and Synthroid.    Disposition: Full code. Stable for transfer to floors or discharge home.    Renetta Kohli PA-C     [id number]
ASSESSMENT:  42 year old female with PMHx of autoimmune hepatitis, psoriasis, hypothyroidism, pineal gland cyst presenting with supraglottitis/epiglottitis requiring emergent intubation for respiratory distress, now extubated.    PLAN:  Neuro: no acute issues  - Monitor mental status.    Resp: supraglottitis/epiglottitis  - Monitor pulse oximeter.  - Out of bed to chair, ambulate as tolerated, and incentive spirometry to prevent atelectasis  - Albuterol as needed for shortness of breath.  - Scoped by ENT with no evidence of edema.   - Appreciate ENT recommendations.    CV: asymptomatic sinus bradycardia  - Monitor vital signs.    GI: no acute issues  - Regular diet as tolerated.    Renal: no acute issues  - Monitor I&Os.  - Monitor electrolytes and replete as necessary.    Heme: no acute issues  - Lovenox for VTE prophylaxis.    ID: supraglottitis/epiglottitis, influenza positive  - Droplet precautions.  - Follow up cultures.  - Monitor for clinical evidence of active infection.  - Clindamycin and Levaquin for supraglottitis/epiglottitis as patient is allergic to penicillins.    Endocrine: supraglottitis/epiglottitis, hypothyroidism, hyperglycemia on steroids  - Decadron 10 mg q12hrs for supraglottitis/epiglottitis on rapid taper  - Monitor glucose and ISS q6hrs for glycemic control. Can discontinue when patient is off steroids.  - Continue home Cytomel and Synthroid.    Disposition: Full code. Stable for transfer to floors or discharge home.

## 2018-01-12 NOTE — DISCHARGE NOTE ADULT - HOSPITAL COURSE
43 y/o M w/ a pmh significant for hypothyroidism (on synthroid), psoriasis (previously on humira, discontinued roughly 1-2 months ago), recurrent sinusitis, and recent dx of autoimmune hepatitis based on liver bx (1/2018) presenting w/ a cc of dysphagia/odynophagia and SOB of 4 days duration. Pt reports she was in [place] for last week and began having progressively worsening difficulty with swallowing solids and liquids  and SOB at rest 4 days ago. She went to an urgent care center today and got duonebs, then was referred to the ED. She also endorses hx of fevers, chills, and persistent non-productive cough during this time. She denies any night sweats, rashes, lip/tongue/facial swelling, HA, blurry vision, rhinorrhea, nasal congestion, CP, abdominal pain, dysuria, or melena. She also denies any known sick contacts but did take flights to and from [place]. No hx of asthma or intubations. States had all childhood vaccinations. Denies any tobacco or recreational drug use, reports occasional ETOH use. Patient was sent to the SICU for airway management and intubation in the operating room due to laryngeal edema likely due to angioedema, treated with steroids, found to have a normal larynx and a positive air leak during indirect laryngoscopy.  Pt was extubated on 1/10 and is doing well.

## 2018-01-12 NOTE — DISCHARGE NOTE ADULT - MEDICATION SUMMARY - MEDICATIONS TO TAKE
I will START or STAY ON the medications listed below when I get home from the hospital:    Synthroid 50 mcg (0.05 mg) oral tablet  --  by mouth   -- Indication: For Hypothyroidism    Cytomel 5 mcg oral tablet  -- 1 tab(s) by mouth once a day  -- Indication: For Hypothyroidism

## 2018-01-12 NOTE — DISCHARGE NOTE ADULT - CARE PROVIDER_API CALL
Pradip Agudelo), Otolaryngology  [address] [place] [address]  Phone: [phone]  Fax: [phone]

## 2018-01-12 NOTE — PROGRESS NOTE ADULT - SUBJECTIVE AND OBJECTIVE BOX
HISTORY  42F with PMH of autoimmune hepatitis, psoriasis, hypothyroidism, pineal gland cyst presented to ED with complaints of cough, congestion and sore throat x few weeks, which have been worsening in the past four days with new associated throat tightness, shortness of breath and dysphagia with solids and liquids. Pt reports she was in [place] for last week and began having progressively worsening difficulty with swallowing solids and liquids and SOB at rest 4 days ago. She went to an urgent care center today and got duonebs, then was referred to the ED. No hx of asthma or intubations. States had all childhood vaccinations.   ED: Patient received IV decadron 10mg, IV valium 2mg, racemic epi, IV tylenol, and IV Clinda 600mg. Patient evaluated by ENT in ED via FOE/laryngoscopy and noted to have supraglottitis/epiglottitis. In ED patient initially noted to be stridorous and tachypneic to 30s, saturating 100% on RA without posturing, drooling, and no acute respiratory distress however, began to acutely worsen and was taken to the OR for intubation by ENT.   SICU consulted for continued airway monitoring post OR intubation.    24 HOUR EVENTS: Patient's airway stable since extubation yesterday. Brief transient complaint of difficulty breathing with decision by ENT not to scope in AM. Patient's diet advanced to regular diet with patient tolerating well. Rapid taper off steroids with decadron changed to q12h from q6h.     SUBJECTIVE/ROS:  [x] A ten-point review of systems was otherwise negative except as noted.  [ ] Due to altered mental status/intubation, subjective information were not able to be obtained from the patient. History was obtained, to the extent possible, from review of the chart and collateral sources of information.      NEURO  CAM ICU: negative   Exam:   Meds:   [x] Adequacy of sedation and pain control has been assessed and adjusted      RESPIRATORY  RR: 19 (01-12-18 @ 00:00) (15 - 40)  SpO2: 96% (01-12-18 @ 00:00) (94% - 100%)  Wt(kg): --  Exam: unlabored, clear to auscultation bilaterally  Mechanical Ventilation:   ABG - ( 11 Jan 2018 02:44 )  pH: 7.49  /  pCO2: 34    /  pO2: 184   / HCO3: 26    / Base Excess: 3.2   /  SaO2: 100     Lactate: x                [ ] Extubation Readiness Assessed  Meds: ALBUTerol/ipratropium for Nebulization 3 milliLiter(s) Nebulizer every 6 hours        CARDIOVASCULAR  HR: 88 (01-12-18 @ 00:00) (41 - 111)  BP: 104/69 (01-12-18 @ 00:00) (90/54 - 132/71)  BP(mean): 81 (01-12-18 @ 00:00) (68 - 99)  ABP: --  ABP(mean): --  Wt(kg): --  CVP(cm H2O): --      Exam:  Cardiac Rhythm:  Perfusion     [ ]Adequate   [ ]Inadequate  Mentation   [ ]Normal       [ ]Reduced  Extremities  [ ]Warm         [ ]Cool  Volume Status [ ]Hypervolemic [ ]Euvolemic [ ]Hypovolemic  Meds:       GI/NUTRITION  Exam:  Diet:  Meds:     GENITOURINARY  I&O's Detail    01-10 @ 07:01 - 01-11 @ 07:00  --------------------------------------------------------  IN:    dextrose 5% + sodium chloride 0.45%.: 1500 mL    fentaNYL  Infusion: 14.5 mL    IV PiggyBack: 550 mL    lactated ringers.: 150 mL    propofol Infusion: 35.2 mL    Solution: 250 mL    Solution: 100 mL    Solution: 100 mL    Solution: 150 mL  Total IN: 2849.7 mL    OUT:    Indwelling Catheter - Urethral: 1345 mL    Voided: 400 mL  Total OUT: 1745 mL    Total NET: 1104.7 mL      01-11 @ 07:01 - 01-12 @ 02:43  --------------------------------------------------------  IN:    IV PiggyBack: 500 mL    Oral Fluid: 500 mL    Solution: 100 mL    Solution: 100 mL    Solution: 550 mL    Solution: 100 mL  Total IN: 1850 mL    OUT:    Voided: 500 mL  Total OUT: 500 mL    Total NET: 1350 mL          01-11    139  |  102  |  8   ----------------------------<  208<H>  3.7   |  25  |  0.58    Ca    8.9      11 Jan 2018 15:38  Phos  1.2     01-11  Mg     2.2     01-11    TPro  7.8  /  Alb  3.8  /  TBili  0.2  /  DBili  x   /  AST  94<H>  /  ALT  57<H>  /  AlkPhos  66  01-10    [ ] Lopez catheter, indication: N/A  Meds:       HEMATOLOGIC  Meds: enoxaparin Injectable 40 milliGRAM(s) SubCutaneous daily    [x] VTE Prophylaxis                        10.2   6.5   )-----------( 155      ( 11 Jan 2018 02:39 )             30.2     PT/INR - ( 11 Jan 2018 02:39 )   PT: 12.5 sec;   INR: 1.14 ratio         PTT - ( 11 Jan 2018 02:39 )  PTT:25.2 sec  Transfusion     [ ] PRBC   [ ] Platelets   [ ] FFP   [ ] Cryoprecipitate      INFECTIOUS DISEASES  T(C): 36.9 (01-12-18 @ 00:00), Max: 36.9 (01-11-18 @ 19:00)  Wt(kg): --    Recent Cultures:  Specimen Source: .Blood Blood, 01-09 @ 22:52; Results   No growth to date.; Gram Stain: --; Organism: --  Specimen Source: .Blood Blood, 01-09 @ 22:51; Results   No growth to date.; Gram Stain: --; Organism: --    Meds: clindamycin IVPB 600 milliGRAM(s) IV Intermittent every 8 hours  levoFLOXacin IVPB 500 milliGRAM(s) IV Intermittent every 24 hours        ENDOCRINE  Capillary Blood Glucose    Meds: dexamethasone  IVPB   IV Intermittent   dexamethasone  IVPB 10 milliGRAM(s) IV Intermittent every 6 hours  insulin lispro (HumaLOG) corrective regimen sliding scale   SubCutaneous three times a day before meals  insulin lispro (HumaLOG) corrective regimen sliding scale   SubCutaneous at bedtime  levothyroxine 50 MICROGram(s) Oral daily  liothyronine 5 MICROGram(s) Oral daily        ACCESS DEVICES:  [ ] Peripheral IV  [ ] Central Venous Line	[ ] R	[ ] L	[ ] IJ	[ ] Fem	[ ] SC	Placed:   [ ] Arterial Line		[ ] R	[ ] L	[ ] Fem	[ ] Rad	[ ] Ax	Placed:   [ ] PICC:					[ ] Mediport  [ ] Urinary Catheter, Date Placed:   [ ] Necessity of urinary, arterial, and venous catheters discussed    OTHER MEDICATIONS:      CODE STATUS:     IMAGING: HISTORY  42F with PMH of autoimmune hepatitis, psoriasis, hypothyroidism, pineal gland cyst presented to ED with complaints of cough, congestion and sore throat x few weeks, which have been worsening in the past four days with new associated throat tightness, shortness of breath and dysphagia with solids and liquids. Pt reports she was in [place] for last week and began having progressively worsening difficulty with swallowing solids and liquids and SOB at rest 4 days ago. She went to an urgent care center today and got duonebs, then was referred to the ED. No hx of asthma or intubations. States had all childhood vaccinations.   ED: Patient received IV decadron 10mg, IV valium 2mg, racemic epi, IV tylenol, and IV Clinda 600mg. Patient evaluated by ENT in ED via FOE/laryngoscopy and noted to have supraglottitis/epiglottitis. In ED patient initially noted to be stridorous and tachypneic to 30s, saturating 100% on RA without posturing, drooling, and no acute respiratory distress however, began to acutely worsen and was taken to the OR for intubation by ENT.   SICU consulted for continued airway monitoring post OR intubation.    24 HOUR EVENTS: Patient's airway stable since extubation yesterday. Brief transient complaint of difficulty breathing with decision by ENT not to scope in AM. Patient's diet advanced to regular diet with patient tolerating well. Rapid taper off steroids with decadron changed to q12h from q6h.     SUBJECTIVE/ROS:  [x] A ten-point review of systems was otherwise negative except as noted.  [ ] Due to altered mental status/intubation, subjective information were not able to be obtained from the patient. History was obtained, to the extent possible, from review of the chart and collateral sources of information.      NEURO  CAM ICU: negative   Exam: AOx3, NAD  Meds: none   [x] Adequacy of sedation and pain control has been assessed and adjusted      RESPIRATORY  RR: 19 (01-12-18 @ 00:00) (15 - 40)  SpO2: 96% (01-12-18 @ 00:00) (94% - 100%)  Wt(kg): --  Exam: unlabored, clear to auscultation bilaterally  Mechanical Ventilation:   ABG - ( 11 Jan 2018 02:44 )  pH: 7.49  /  pCO2: 34    /  pO2: 184   / HCO3: 26    / Base Excess: 3.2   /  SaO2: 100     Lactate: x            [ ] Extubation Readiness Assessed  Meds: ALBUTerol/ipratropium for Nebulization 3 milliLiter(s) Nebulizer every 6 hours        CARDIOVASCULAR  HR: 88 (01-12-18 @ 00:00) (41 - 111)  BP: 104/69 (01-12-18 @ 00:00) (90/54 - 132/71)  BP(mean): 81 (01-12-18 @ 00:00) (68 - 99)  ABP: --  ABP(mean): --  Wt(kg): --  CVP(cm H2O): --      Exam: s1s2, rrr  Cardiac Rhythm: sinus  Perfusion     [x]Adequate   [ ]Inadequate  Mentation   [x]Normal       [ ]Reduced  Extremities  [x]Warm         [ ]Cool  Volume Status [ ]Hypervolemic [x]Euvolemic [ ]Hypovolemic  Meds:       GI/NUTRITION  Exam: soft, nontender, nondistended   Diet: regular   Meds:     GENITOURINARY  I&O's Detail    01-10 @ 07:01 - 01-11 @ 07:00  --------------------------------------------------------  IN:    dextrose 5% + sodium chloride 0.45%.: 1500 mL    fentaNYL  Infusion: 14.5 mL    IV PiggyBack: 550 mL    lactated ringers.: 150 mL    propofol Infusion: 35.2 mL    Solution: 250 mL    Solution: 100 mL    Solution: 100 mL    Solution: 150 mL  Total IN: 2849.7 mL    OUT:    Indwelling Catheter - Urethral: 1345 mL    Voided: 400 mL  Total OUT: 1745 mL    Total NET: 1104.7 mL      01-11 @ 07:01 - 01-12 @ 02:43  --------------------------------------------------------  IN:    IV PiggyBack: 500 mL    Oral Fluid: 500 mL    Solution: 100 mL    Solution: 100 mL    Solution: 550 mL    Solution: 100 mL  Total IN: 1850 mL    OUT:    Voided: 500 mL  Total OUT: 500 mL    Total NET: 1350 mL          01-11    139  |  102  |  8   ----------------------------<  208<H>  3.7   |  25  |  0.58    Ca    8.9      11 Jan 2018 15:38  Phos  1.2     01-11  Mg     2.2     01-11    TPro  7.8  /  Alb  3.8  /  TBili  0.2  /  DBili  x   /  AST  94<H>  /  ALT  57<H>  /  AlkPhos  66  01-10    [ ] Lopez catheter, indication: N/A  Meds:       HEMATOLOGIC  Meds: enoxaparin Injectable 40 milliGRAM(s) SubCutaneous daily    [x] VTE Prophylaxis                        10.2   6.5   )-----------( 155      ( 11 Jan 2018 02:39 )             30.2     PT/INR - ( 11 Jan 2018 02:39 )   PT: 12.5 sec;   INR: 1.14 ratio         PTT - ( 11 Jan 2018 02:39 )  PTT:25.2 sec  Transfusion     [ ] PRBC   [ ] Platelets   [ ] FFP   [ ] Cryoprecipitate      INFECTIOUS DISEASES  T(C): 36.9 (01-12-18 @ 00:00), Max: 36.9 (01-11-18 @ 19:00)  Wt(kg): --    Recent Cultures:  Specimen Source: .Blood Blood, 01-09 @ 22:52; Results   No growth to date.; Gram Stain: --; Organism: --  Specimen Source: .Blood Blood, 01-09 @ 22:51; Results   No growth to date.; Gram Stain: --; Organism: --    Meds: clindamycin IVPB 600 milliGRAM(s) IV Intermittent every 8 hours  levoFLOXacin IVPB 500 milliGRAM(s) IV Intermittent every 24 hours        ENDOCRINE  Capillary Blood Glucose    Meds: dexamethasone  IVPB   IV Intermittent   dexamethasone  IVPB 10 milliGRAM(s) IV Intermittent every 6 hours  insulin lispro (HumaLOG) corrective regimen sliding scale   SubCutaneous three times a day before meals  insulin lispro (HumaLOG) corrective regimen sliding scale   SubCutaneous at bedtime  levothyroxine 50 MICROGram(s) Oral daily  liothyronine 5 MICROGram(s) Oral daily        ACCESS DEVICES:  [x] Peripheral IV  [ ] Central Venous Line	[ ] R	[ ] L	[ ] IJ	[ ] Fem	[ ] SC	Placed:   [ ] Arterial Line		[ ] R	[ ] L	[ ] Fem	[ ] Rad	[ ] Ax	Placed:   [ ] PICC:					[ ] Mediport  [ ] Urinary Catheter, Date Placed:   [x] Necessity of urinary, arterial, and venous catheters discussed    OTHER MEDICATIONS:      CODE STATUS: full code     IMAGING:

## 2018-01-12 NOTE — DISCHARGE NOTE ADULT - CARE PLAN
Principal Discharge DX:	Angioedema  Goal:	maintain no swelling in her  Instructions for follow-up, activity and diet:	- follow up in one week with Dr. Agudelo  -

## 2018-01-14 LAB
CULTURE RESULTS: SIGNIFICANT CHANGE UP
CULTURE RESULTS: SIGNIFICANT CHANGE UP
SPECIMEN SOURCE: SIGNIFICANT CHANGE UP
SPECIMEN SOURCE: SIGNIFICANT CHANGE UP

## 2018-01-16 PROBLEM — L40.9 PSORIASIS, UNSPECIFIED: Chronic | Status: ACTIVE | Noted: 2018-01-09

## 2018-01-16 PROBLEM — K75.4 AUTOIMMUNE HEPATITIS: Chronic | Status: ACTIVE | Noted: 2018-01-09

## 2018-01-16 PROBLEM — E34.8 OTHER SPECIFIED ENDOCRINE DISORDERS: Chronic | Status: ACTIVE | Noted: 2018-01-09

## 2018-01-17 ENCOUNTER — APPOINTMENT (OUTPATIENT)
Age: 43
End: 2018-01-17

## 2018-01-17 ENCOUNTER — INPATIENT (INPATIENT)
Facility: HOSPITAL | Age: 43
LOS: 1 days | Discharge: HOME | DRG: 204 | End: 2018-01-19
Attending: HOSPITALIST | Admitting: HOSPITALIST
Payer: MEDICAID

## 2018-01-17 VITALS
BODY MASS INDEX: 21.9 KG/M2 | HEART RATE: 87 BPM | RESPIRATION RATE: 17 BRPM | HEIGHT: 62 IN | DIASTOLIC BLOOD PRESSURE: 80 MMHG | SYSTOLIC BLOOD PRESSURE: 122 MMHG | TEMPERATURE: 98.6 F | WEIGHT: 119 LBS

## 2018-01-17 VITALS
SYSTOLIC BLOOD PRESSURE: 104 MMHG | OXYGEN SATURATION: 100 % | HEART RATE: 83 BPM | RESPIRATION RATE: 24 BRPM | DIASTOLIC BLOOD PRESSURE: 72 MMHG

## 2018-01-17 DIAGNOSIS — Z98.890 OTHER SPECIFIED POSTPROCEDURAL STATES: Chronic | ICD-10-CM

## 2018-01-17 DIAGNOSIS — R06.1 STRIDOR: ICD-10-CM

## 2018-01-17 DIAGNOSIS — E03.9 HYPOTHYROIDISM, UNSPECIFIED: ICD-10-CM

## 2018-01-17 DIAGNOSIS — R63.8 OTHER SYMPTOMS AND SIGNS CONCERNING FOOD AND FLUID INTAKE: ICD-10-CM

## 2018-01-17 DIAGNOSIS — Z29.9 ENCOUNTER FOR PROPHYLACTIC MEASURES, UNSPECIFIED: ICD-10-CM

## 2018-01-17 DIAGNOSIS — Z98.89 OTHER SPECIFIED POSTPROCEDURAL STATES: Chronic | ICD-10-CM

## 2018-01-17 DIAGNOSIS — K75.4 AUTOIMMUNE HEPATITIS: ICD-10-CM

## 2018-01-17 LAB
ALBUMIN SERPL ELPH-MCNC: 4.6 G/DL — SIGNIFICANT CHANGE UP (ref 3.3–5)
ALP SERPL-CCNC: 64 U/L — SIGNIFICANT CHANGE UP (ref 40–120)
ALT FLD-CCNC: 48 U/L RC — HIGH (ref 10–45)
ANION GAP SERPL CALC-SCNC: 14 MMOL/L — SIGNIFICANT CHANGE UP (ref 5–17)
APTT BLD: 22.4 SEC — LOW (ref 27.5–37.4)
AST SERPL-CCNC: 43 U/L — HIGH (ref 10–40)
BASOPHILS # BLD AUTO: 0.1 K/UL — SIGNIFICANT CHANGE UP (ref 0–0.2)
BASOPHILS NFR BLD AUTO: 0.8 % — SIGNIFICANT CHANGE UP (ref 0–2)
BILIRUB SERPL-MCNC: 0.2 MG/DL — SIGNIFICANT CHANGE UP (ref 0.2–1.2)
BLD GP AB SCN SERPL QL: NEGATIVE — SIGNIFICANT CHANGE UP
BUN SERPL-MCNC: 9 MG/DL — SIGNIFICANT CHANGE UP (ref 7–23)
CALCIUM SERPL-MCNC: 9.3 MG/DL — SIGNIFICANT CHANGE UP (ref 8.4–10.5)
CHLORIDE SERPL-SCNC: 102 MMOL/L — SIGNIFICANT CHANGE UP (ref 96–108)
CO2 SERPL-SCNC: 27 MMOL/L — SIGNIFICANT CHANGE UP (ref 22–31)
CREAT SERPL-MCNC: 0.63 MG/DL — SIGNIFICANT CHANGE UP (ref 0.5–1.3)
EOSINOPHIL # BLD AUTO: 0 K/UL — SIGNIFICANT CHANGE UP (ref 0–0.5)
EOSINOPHIL NFR BLD AUTO: 0.3 % — SIGNIFICANT CHANGE UP (ref 0–6)
GAS PNL BLDV: SIGNIFICANT CHANGE UP
GAS PNL BLDV: SIGNIFICANT CHANGE UP
GLUCOSE SERPL-MCNC: 103 MG/DL — HIGH (ref 70–99)
HCT VFR BLD CALC: 38.6 % — SIGNIFICANT CHANGE UP (ref 34.5–45)
HGB BLD-MCNC: 13.2 G/DL — SIGNIFICANT CHANGE UP (ref 11.5–15.5)
INR BLD: 1 RATIO — SIGNIFICANT CHANGE UP (ref 0.88–1.16)
LYMPHOCYTES # BLD AUTO: 2.4 K/UL — SIGNIFICANT CHANGE UP (ref 1–3.3)
LYMPHOCYTES # BLD AUTO: 35.9 % — SIGNIFICANT CHANGE UP (ref 13–44)
MCHC RBC-ENTMCNC: 29.5 PG — SIGNIFICANT CHANGE UP (ref 27–34)
MCHC RBC-ENTMCNC: 34.2 GM/DL — SIGNIFICANT CHANGE UP (ref 32–36)
MCV RBC AUTO: 86.2 FL — SIGNIFICANT CHANGE UP (ref 80–100)
MONOCYTES # BLD AUTO: 0.7 K/UL — SIGNIFICANT CHANGE UP (ref 0–0.9)
MONOCYTES NFR BLD AUTO: 11.2 % — SIGNIFICANT CHANGE UP (ref 2–14)
NEUTROPHILS # BLD AUTO: 3.4 K/UL — SIGNIFICANT CHANGE UP (ref 1.8–7.4)
NEUTROPHILS NFR BLD AUTO: 51.8 % — SIGNIFICANT CHANGE UP (ref 43–77)
PLATELET # BLD AUTO: 295 K/UL — SIGNIFICANT CHANGE UP (ref 150–400)
POTASSIUM SERPL-MCNC: 4.5 MMOL/L — SIGNIFICANT CHANGE UP (ref 3.5–5.3)
POTASSIUM SERPL-SCNC: 4.5 MMOL/L — SIGNIFICANT CHANGE UP (ref 3.5–5.3)
PROT SERPL-MCNC: 8.6 G/DL — HIGH (ref 6–8.3)
PROTHROM AB SERPL-ACNC: 10.8 SEC — SIGNIFICANT CHANGE UP (ref 9.8–12.7)
RAPID RVP RESULT: SIGNIFICANT CHANGE UP
RBC # BLD: 4.48 M/UL — SIGNIFICANT CHANGE UP (ref 3.8–5.2)
RBC # FLD: 16.2 % — HIGH (ref 10.3–14.5)
RH IG SCN BLD-IMP: POSITIVE — SIGNIFICANT CHANGE UP
SODIUM SERPL-SCNC: 143 MMOL/L — SIGNIFICANT CHANGE UP (ref 135–145)
T3 SERPL-MCNC: 145 NG/DL — SIGNIFICANT CHANGE UP (ref 80–200)
T4 AB SER-ACNC: 10.9 UG/DL — SIGNIFICANT CHANGE UP (ref 4.6–12)
TSH SERPL-MCNC: 0.49 UIU/ML — SIGNIFICANT CHANGE UP (ref 0.27–4.2)
WBC # BLD: 6.6 K/UL — SIGNIFICANT CHANGE UP (ref 3.8–10.5)
WBC # FLD AUTO: 6.6 K/UL — SIGNIFICANT CHANGE UP (ref 3.8–10.5)

## 2018-01-17 PROCEDURE — 31575 DIAGNOSTIC LARYNGOSCOPY: CPT

## 2018-01-17 PROCEDURE — 99222 1ST HOSP IP/OBS MODERATE 55: CPT | Mod: 25

## 2018-01-17 PROCEDURE — 71045 X-RAY EXAM CHEST 1 VIEW: CPT | Mod: 26

## 2018-01-17 PROCEDURE — 99291 CRITICAL CARE FIRST HOUR: CPT

## 2018-01-17 PROCEDURE — 99232 SBSQ HOSP IP/OBS MODERATE 35: CPT

## 2018-01-17 PROCEDURE — 99223 1ST HOSP IP/OBS HIGH 75: CPT

## 2018-01-17 PROCEDURE — 70360 X-RAY EXAM OF NECK: CPT | Mod: 26

## 2018-01-17 RX ORDER — SODIUM CHLORIDE 9 MG/ML
1000 INJECTION INTRAMUSCULAR; INTRAVENOUS; SUBCUTANEOUS
Qty: 0 | Refills: 0 | Status: DISCONTINUED | OUTPATIENT
Start: 2018-01-17 | End: 2018-01-18

## 2018-01-17 RX ORDER — LEVOTHYROXINE SODIUM 125 MCG
50 TABLET ORAL DAILY
Qty: 0 | Refills: 0 | Status: DISCONTINUED | OUTPATIENT
Start: 2018-01-17 | End: 2018-01-19

## 2018-01-17 RX ORDER — LIOTHYRONINE SODIUM 25 UG/1
5 TABLET ORAL DAILY
Qty: 0 | Refills: 0 | Status: DISCONTINUED | OUTPATIENT
Start: 2018-01-17 | End: 2018-01-19

## 2018-01-17 RX ORDER — MECLIZINE HCL 12.5 MG
25 TABLET ORAL ONCE
Qty: 0 | Refills: 0 | Status: COMPLETED | OUTPATIENT
Start: 2018-01-17 | End: 2018-01-17

## 2018-01-17 RX ORDER — ENOXAPARIN SODIUM 100 MG/ML
40 INJECTION SUBCUTANEOUS EVERY 24 HOURS
Qty: 0 | Refills: 0 | Status: DISCONTINUED | OUTPATIENT
Start: 2018-01-17 | End: 2018-01-19

## 2018-01-17 RX ORDER — DEXAMETHASONE 0.5 MG/5ML
10 ELIXIR ORAL ONCE
Qty: 0 | Refills: 0 | Status: COMPLETED | OUTPATIENT
Start: 2018-01-17 | End: 2018-01-17

## 2018-01-17 RX ORDER — EPINEPHRINE 0.3 MG/.3ML
0.3 INJECTION INTRAMUSCULAR; SUBCUTANEOUS ONCE
Qty: 0 | Refills: 0 | Status: COMPLETED | OUTPATIENT
Start: 2018-01-17 | End: 2018-01-17

## 2018-01-17 RX ADMIN — SODIUM CHLORIDE 100 MILLILITER(S): 9 INJECTION INTRAMUSCULAR; INTRAVENOUS; SUBCUTANEOUS at 18:50

## 2018-01-17 RX ADMIN — SODIUM CHLORIDE 100 MILLILITER(S): 9 INJECTION INTRAMUSCULAR; INTRAVENOUS; SUBCUTANEOUS at 16:11

## 2018-01-17 RX ADMIN — EPINEPHRINE 0.3 MILLIGRAM(S): 0.3 INJECTION INTRAMUSCULAR; SUBCUTANEOUS at 16:39

## 2018-01-17 RX ADMIN — Medication 25 MILLIGRAM(S): at 19:10

## 2018-01-17 RX ADMIN — Medication 102 MILLIGRAM(S): at 16:11

## 2018-01-17 NOTE — H&P ADULT - PROBLEM SELECTOR PLAN 1
--unclear etiology  --DDx includes angioedema (less likely given normal exam today despite persistent symptoms) vs. vocal cord dysfunction (also not observed on scope) vs. psychogenic  --less likely epiglottitis/supraglottitis given rapid resolution of edema on prior admission  --also arguing against edema is the fact that patient never desaturated today or on prior admission despite ssubjective distress  --patient is s/p FFP, dexamethosone 10mg IVPD and epipen  --will maintain epi pen at bedside shoulde symptoms recur  --holding continued steroids given benign exam  --appreciate ENT assistance, will continue to engage them overnight should symptoms worsen

## 2018-01-17 NOTE — CONSULT NOTE ADULT - SUBJECTIVE AND OBJECTIVE BOX
CHIEF COMPLAINT: stridor    HPI:  43 y/o M w/ a pmhx significant for hypothyroidism, psoriasis (previously on humira), recurrent sinusitis, and recent dx of autoimmune hepatitis based on liver bx (1/2018) presenting w/ "stridors" since yesteray. Pt was just discharged from [facility] ENT service where she was evaluated for laryngeal edema.    Patient was originally admitted to the SICU for airway management given scope findings and was intubation in the operating room due to laryngeal edema likely due to angioedema, treated with steroids, found to have a normal larynx and a positive air leak during indirect laryngoscopy.  Pt was successfully extubated within 24 hours without any issues. Pt was discharged home when she has now developed "Stridor every 2 minutes."    Talking makes the stridor worse, however when having a conversation with the patient the stridor goes away completely.    Pt was once again see by ENT and underwent a scope today, which was negative for any source of her stridor. No edema identified, as per ENT, it  may be psychogenic in nature given saturation persistently 100% and the nature of the stridoring which may be self induced.           PAST MEDICAL & SURGICAL HISTORY:  Autoimmune hepatitis  Psoriasis  Pineal gland cyst  Hypothyroid  History of liver biopsy  History of knee surgery      FAMILY HISTORY:  None      Allergies    No Known Allergies    Intolerances:    Augmentin (Hepatotoxicity)  Compazine (Dystonic RXN)  Zofran (Dystonic RXN)      HOME MEDICATIONS:    REVIEW OF SYSTEMS:  Constitutional:   Eyes:  ENT:  CV:  Resp:  GI:  :  MSK:  Integumentary:  Neurological:  Psychiatric:  Endocrine:  Hematologic/Lymphatic:  Allergic/Immunologic:  [ ] All other systems negative  [ ] Unable to assess ROS because ________    OBJECTIVE:  ICU Vital Signs Last 24 Hrs  T(C): 36.8 (17 Jan 2018 16:20), Max: 36.8 (17 Jan 2018 16:20)  T(F): 98.3 (17 Jan 2018 16:20), Max: 98.3 (17 Jan 2018 16:20)  HR: 84 (17 Jan 2018 16:20) (83 - 86)  BP: 120/90 (17 Jan 2018 16:20) (104/72 - 123/99)  BP(mean): 101 (17 Jan 2018 16:20) (101 - 108)  ABP: --  ABP(mean): --  RR: 19 (17 Jan 2018 16:20) (19 - 24)  SpO2: 100% (17 Jan 2018 16:20) (100% - 100%)        CAPILLARY BLOOD GLUCOSE          PHYSICAL EXAM:  General:   HEENT:   Lymph Nodes:  Neck:   Respiratory:   Cardiovascular:   Abdomen:   Extremities:   Skin:   Neurological:  Psychiatry:    HOSPITAL MEDICATIONS:  MEDICATIONS  (STANDING):  sodium chloride 0.9%. 1000 milliLiter(s) (100 mL/Hr) IV Continuous <Continuous>    MEDICATIONS  (PRN):      LABS:                        13.2   6.6   )-----------( 295      ( 17 Jan 2018 16:20 )             38.6     01-17    143  |  102  |  9   ----------------------------<  103<H>  4.5   |  27  |  0.63    Ca    9.3      17 Jan 2018 16:20    TPro  8.6<H>  /  Alb  4.6  /  TBili  0.2  /  DBili  x   /  AST  43<H>  /  ALT  48<H>  /  AlkPhos  64  01-17    PT/INR - ( 17 Jan 2018 16:20 )   PT: 10.8 sec;   INR: 1.00 ratio         PTT - ( 17 Jan 2018 16:20 )  PTT:22.4 sec          MICROBIOLOGY:     RADIOLOGY:  [ ] Reviewed and interpreted by me    EKG: CHIEF COMPLAINT: stridor    HPI:  41 y/o F w/ a pmhx significant for hypothyroidism, psoriasis (previously on humira), recurrent sinusitis, and recent dx of autoimmune hepatitis based on liver bx (1/2018) presenting w/ "stridors" since yesteray. Pt was just discharged from [facility] ENT service where she was evaluated for laryngeal edema on 1/11/18    Patient was originally admitted to the SICU on 1/09/11  for airway management given scope findings and was intubation in the operating room due to laryngeal edema likely due to angioedema, treated with steroids, found to have a normal larynx and a positive air leak during indirect laryngoscopy.  Pt was successfully extubated within 24 hours without any issues. Pt was discharged home when she has now developed "Stridors every 2 minutes."    Talking makes the stridor worse, however when having a conversation with the patient the stridor goes away completely.    Pt was once again see by ENT today and underwent a repeat scope, which was negative for any source of her stridor. No edema identified as per ENT. It  may be psychogenic in nature given saturation persistently 100% and the nature of the stridoring which may be self induced.         PAST MEDICAL & SURGICAL HISTORY:  Autoimmune hepatitis  Psoriasis  Pineal gland cyst  Hypothyroid  History of liver biopsy  History of knee surgery      FAMILY HISTORY:  None      Allergies    No Known Allergies    Intolerances:    Augmentin (Hepatotoxicity)  Compazine (Dystonic RXN)  Zofran (Dystonic RXN)      HOME MEDICATIONS:  Constitutional: denies fevers, chills, night sweats, weight loss  HEENT: denies visual changes, hearing changes, rhinitis, odynophagia, or dysphagia  Cardiovascular: denies palpitations, chest pain, edema  Respiratory: denies SOB, wheezing  Gastrointestinal: denies N/V/D, abdominal pain, hematochezia, melena  : denies dysuria, hematuria  MSK: denies weakness, joint pain  Skin: denies new rashes or masses  Psych: + anxious  Neuro: denies tremors, sensory or motor deficits    OBJECTIVE:  ICU Vital Signs Last 24 Hrs  T(C): 36.8 (17 Jan 2018 16:20), Max: 36.8 (17 Jan 2018 16:20)  T(F): 98.3 (17 Jan 2018 16:20), Max: 98.3 (17 Jan 2018 16:20)  HR: 84 (17 Jan 2018 16:20) (83 - 86)  BP: 120/90 (17 Jan 2018 16:20) (104/72 - 123/99)  BP(mean): 101 (17 Jan 2018 16:20) (101 - 108)  ABP: --  ABP(mean): --  RR: 19 (17 Jan 2018 16:20) (19 - 24)  SpO2: 100% (17 Jan 2018 16:20) (100% - 100%)        CAPILLARY BLOOD GLUCOSE    PHYSICAL EXAM:  GENERAL: NAD, well-developed  HEAD: Atraumatic, Normocephalic  EYES: EOMI, PERRLA, conjunctiva and sclera clear  NECK: Supple, No JVD  CHEST/LUNG: Clear to auscultation bilaterally; minimal stridor when speaking only  HEART: Regular rate and rhythm; No murmurs, rubs, or gallops  ABDOMEN: Soft, Nontender, Nondistended; Bowel sounds present  EXTREMITIES:  2+ dP pulses b/l, No clubbing, cyanosis, or edema  PSYCH: reactive affect  NEUROLOGY: AAOx3, non-focal  SKIN: No rashes or lesions    HOSPITAL MEDICATIONS:  MEDICATIONS  (STANDING):  sodium chloride 0.9%. 1000 milliLiter(s) (100 mL/Hr) IV Continuous <Continuous>    MEDICATIONS  (PRN):      LABS:                        13.2   6.6   )-----------( 295      ( 17 Jan 2018 16:20 )             38.6     01-17    143  |  102  |  9   ----------------------------<  103<H>  4.5   |  27  |  0.63    Ca    9.3      17 Jan 2018 16:20    TPro  8.6<H>  /  Alb  4.6  /  TBili  0.2  /  DBili  x   /  AST  43<H>  /  ALT  48<H>  /  AlkPhos  64  01-17    PT/INR - ( 17 Jan 2018 16:20 )   PT: 10.8 sec;   INR: 1.00 ratio         PTT - ( 17 Jan 2018 16:20 )  PTT:22.4 sec        MICROBIOLOGY:     RADIOLOGY:  [x ] Reviewed and interpreted by me CHIEF COMPLAINT: stridor    HPI:41 y/o F w/ a pmhx significant for hypothyroidism, psoriasis (previously on humira), recurrent sinusitis, and recent dx of autoimmune hepatitis based on liver bx (1/2018) presenting w/ "stridors" since yesteray. Pt was just discharged from [facility] ENT service where she was evaluated for laryngeal edema on 1/11/18    Patient was originally admitted to the SICU on 1/09/11  for airway management given scope findings and was intubation in the operating room due to laryngeal edema likely due to angioedema, treated with steroids, found to have a normal larynx and a positive air leak during indirect laryngoscopy.  Pt was successfully extubated within 24 hours without any issues. Pt was discharged home when she has now developed "Stridors every 2 minutes."    Talking makes the stridor worse, however when having a conversation with the patient the stridor goes away completely.    Pt was once again see by ENT today and underwent a repeat scope, which was negative for any source of her stridor. No edema identified as per ENT. It  may be psychogenic in nature given saturation persistently 100% and the nature of the stridoring which may be self induced.         PAST MEDICAL & SURGICAL HISTORY:  Autoimmune hepatitis  Psoriasis  Pineal gland cyst  Hypothyroid  History of liver biopsy  History of knee surgery      FAMILY HISTORY:  None      Allergies    No Known Allergies    Intolerances:    Augmentin (Hepatotoxicity)  Compazine (Dystonic RXN)  Zofran (Dystonic RXN)      HOME MEDICATIONS:  Constitutional: denies fevers, chills, night sweats, weight loss  HEENT: denies visual changes, hearing changes, rhinitis, odynophagia, or dysphagia  Cardiovascular: denies palpitations, chest pain, edema  Respiratory: denies SOB, wheezing  Gastrointestinal: denies N/V/D, abdominal pain, hematochezia, melena  : denies dysuria, hematuria  MSK: denies weakness, joint pain  Skin: denies new rashes or masses  Psych: + anxious  Neuro: denies tremors, sensory or motor deficits    OBJECTIVE:  ICU Vital Signs Last 24 Hrs  T(C): 36.8 (17 Jan 2018 16:20), Max: 36.8 (17 Jan 2018 16:20)  T(F): 98.3 (17 Jan 2018 16:20), Max: 98.3 (17 Jan 2018 16:20)  HR: 84 (17 Jan 2018 16:20) (83 - 86)  BP: 120/90 (17 Jan 2018 16:20) (104/72 - 123/99)  BP(mean): 101 (17 Jan 2018 16:20) (101 - 108)  ABP: --  ABP(mean): --  RR: 19 (17 Jan 2018 16:20) (19 - 24)  SpO2: 100% (17 Jan 2018 16:20) (100% - 100%)        CAPILLARY BLOOD GLUCOSE    PHYSICAL EXAM:  GENERAL: NAD, well-developed  HEAD: Atraumatic, Normocephalic  EYES: EOMI, PERRLA, conjunctiva and sclera clear  NECK: Supple, No JVD  CHEST/LUNG: Clear to auscultation bilaterally; minimal stridor when speaking only  HEART: Regular rate and rhythm; No murmurs, rubs, or gallops  ABDOMEN: Soft, Nontender, Nondistended; Bowel sounds present  EXTREMITIES:  2+ dP pulses b/l, No clubbing, cyanosis, or edema  PSYCH: reactive affect  NEUROLOGY: AAOx3, non-focal  SKIN: No rashes or lesions    HOSPITAL MEDICATIONS:  MEDICATIONS  (STANDING):  sodium chloride 0.9%. 1000 milliLiter(s) (100 mL/Hr) IV Continuous <Continuous>    MEDICATIONS  (PRN):      LABS:                        13.2   6.6   )-----------( 295      ( 17 Jan 2018 16:20 )             38.6     01-17    143  |  102  |  9   ----------------------------<  103<H>  4.5   |  27  |  0.63    Ca    9.3      17 Jan 2018 16:20    TPro  8.6<H>  /  Alb  4.6  /  TBili  0.2  /  DBili  x   /  AST  43<H>  /  ALT  48<H>  /  AlkPhos  64  01-17    PT/INR - ( 17 Jan 2018 16:20 )   PT: 10.8 sec;   INR: 1.00 ratio         PTT - ( 17 Jan 2018 16:20 )  PTT:22.4 sec        MICROBIOLOGY:     RADIOLOGY:  [x ] Reviewed and interpreted by me CHIEF COMPLAINT: stridor    HPI:43 y/o F w/ a pmhx significant for hypothyroidism, psoriasis (previously on humira), recurrent sinusitis, and recent dx of autoimmune hepatitis based on liver bx (1/2018) presenting w/ "stridors" since yesteray. Pt was just discharged from [facility] ENT service where she was evaluated for laryngeal edema on 1/11/18    Patient was originally admitted to the SICU on 1/09/11  for airway management given scope findings and was intubation in the operating room due to laryngeal edema likely due to angioedema, treated with steroids, found to have a normal larynx and a positive air leak during indirect laryngoscopy.  Pt was successfully extubated within 24 hours without any issues. Pt was discharged home when she has now developed "Stridors every 2 minutes."    Talking makes the stridor worse, however when having a conversation with the patient the stridor goes away completely.    Pt was once again see by ENT today and underwent a repeat scope, which was negative for any source of her stridor. No edema identified as per ENT. It  may be psychogenic in nature given saturation persistently 100% and the nature of the stridoring which may be self induced.         PAST MEDICAL & SURGICAL HISTORY:  Autoimmune hepatitis  Psoriasis  Pineal gland cyst  Hypothyroid  History of liver biopsy  History of knee surgery      FAMILY HISTORY:  None      Allergies    No Known Allergies    Intolerances:    Augmentin (Hepatotoxicity)  Compazine (Dystonic RXN)  Zofran (Dystonic RXN)      ROS:  Constitutional: denies fevers, chills, night sweats, weight loss  HEENT: denies visual changes, hearing changes, rhinitis, odynophagia, or dysphagia  Cardiovascular: denies palpitations, chest pain, edema  Respiratory: +stridor when talking  Gastrointestinal: denies N/V/D, abdominal pain, hematochezia, melena  : denies dysuria, hematuria  MSK: denies weakness, joint pain  Skin: denies new rashes or masses  Psych: + anxious  Neuro: denies tremors, sensory or motor deficits    OBJECTIVE:  ICU Vital Signs Last 24 Hrs  T(C): 36.8 (17 Jan 2018 16:20), Max: 36.8 (17 Jan 2018 16:20)  T(F): 98.3 (17 Jan 2018 16:20), Max: 98.3 (17 Jan 2018 16:20)  HR: 84 (17 Jan 2018 16:20) (83 - 86)  BP: 120/90 (17 Jan 2018 16:20) (104/72 - 123/99)  BP(mean): 101 (17 Jan 2018 16:20) (101 - 108)  ABP: --  ABP(mean): --  RR: 19 (17 Jan 2018 16:20) (19 - 24)  SpO2: 100% (17 Jan 2018 16:20) (100% - 100%)        CAPILLARY BLOOD GLUCOSE    PHYSICAL EXAM:  GENERAL: NAD, well-developed  HEAD: Atraumatic, Normocephalic  EYES: EOMI, PERRLA, conjunctiva and sclera clear  NECK: Supple, No JVD  CHEST/LUNG: Clear to auscultation bilaterally; minimal stridor when speaking only  HEART: Regular rate and rhythm; No murmurs, rubs, or gallops  ABDOMEN: Soft, Nontender, Nondistended; Bowel sounds present  EXTREMITIES:  2+ dP pulses b/l, No clubbing, cyanosis, or edema  PSYCH: reactive affect  NEUROLOGY: AAOx3, non-focal  SKIN: No rashes or lesions    HOSPITAL MEDICATIONS:  MEDICATIONS  (STANDING):  sodium chloride 0.9%. 1000 milliLiter(s) (100 mL/Hr) IV Continuous <Continuous>    MEDICATIONS  (PRN):      LABS:                        13.2   6.6   )-----------( 295      ( 17 Jan 2018 16:20 )             38.6     01-17    143  |  102  |  9   ----------------------------<  103<H>  4.5   |  27  |  0.63    Ca    9.3      17 Jan 2018 16:20    TPro  8.6<H>  /  Alb  4.6  /  TBili  0.2  /  DBili  x   /  AST  43<H>  /  ALT  48<H>  /  AlkPhos  64  01-17    PT/INR - ( 17 Jan 2018 16:20 )   PT: 10.8 sec;   INR: 1.00 ratio         PTT - ( 17 Jan 2018 16:20 )  PTT:22.4 sec        MICROBIOLOGY:     RADIOLOGY:  [x ] Reviewed and interpreted by me

## 2018-01-17 NOTE — ED ADULT TRIAGE NOTE - CHIEF COMPLAINT QUOTE
Diff breathing - recently intubated for stridor and unsuccessful relief to clear airway upon steroid treatment

## 2018-01-17 NOTE — H&P ADULT - ASSESSMENT
This is a 42 year old woman with  hypothyroidism, psoriasis (previously on humira), recurrent sinusitis, and recent dx of autoimmune hepatitis based on liver bx (1/2018) with recent admission 1/9-1/12 for stridor now presenting with recurrent stridor x 2 days.

## 2018-01-17 NOTE — CONSULT NOTE ADULT - ATTENDING COMMENTS
This is a 43 y/o F w/ a pmhx significant for hypothyroidism, psoriasis (previously on (adalimumab), recurrent sinusitis, and recent dx of autoimmune hepatitis based on liver bx (1/2018) presenting w/ "stridors" since yesterday. Pt was just discharged from [facility] ENT service where she was evaluated for laryngeal edema on 1/11/18  Patient was originally admitted to the SICU on 1/09/11  for airway management given scope findings and was intubation in the operating room due to laryngeal edema likely due to angioedema, treated with steroids, found to have a normal larynx and a positive air leak during indirect laryngoscopy.  Pt was successfully extubated within 24 hours without any issues. Pt was discharged home when she has now developed "Stridors every 2 minutes." As per patient the stridor has returned and worst today. Talking makes the stridor worse, however when having a conversation with the patient the stridor goes away completely.  Pt was once again see by ENT today and underwent a repeat scope, which was negative for any source of her stridor. No edema identified as per ENT. It  may be psychogenic in nature given saturation persistently 100%, managing her secretions well, nontoxic appearing  and the nature of the stridor appears to be induced .  1. Stridor vs globus hystericus- reflux laryngitis- no evidence of distress, moving good air, continue steroids and supportive care. Needs phycological evaluation may be having an adjustment disorder to some personal stressor,  but does not endorse any active issues that need immediate attention. Check serum and urine toxicology. Racemic epi as needed. f/U with ENT team.  2. Autoimmune hepatitis- monitor hepatic synthetic function, f/u with hepatology.  Care and treatment as detailed prior unless otherwise stated. No need for MICU at this juncture. Will continue to follow. Case discussed with patient, mom at the bedside, staff, team and specialist on board. Please feel free to re-consult if any change in patient's clinical status.

## 2018-01-17 NOTE — ED ADULT NURSE NOTE - OBJECTIVE STATEMENT
41 y/o female presented to the ED via ambulance from ENT office, pt was having SOB, pt stated felt like throat was closing up and felt like her previous symptoms of epiglottitis. Epiglottitis. pt [facility] and intubated on Jan 9th. pt has history of Hypothyroid, autoimmune Hepatitis. 43 y/o female presented to the ED via ambulance from ENT office, pt was having SOB, pt stated felt like throat was closing up and felt similar to her previous symptoms of epiglottitis on Jan 9th. pt stated she had symptoms last night that resolved. pt states it hurts to swallow, endorses neck pain.  pt recently admitted to [facility] and intubated on Jan 9th, pt was Flu + on last admission. pt has history of Hypothyroid, autoimmune Hepatitis. upon arrival to ED pt c/o difficulty breathing. placed on 100% NRB, tolerating well. Dr. Gonzalez and Dr. Goff next to bedside. pt was given 10mg Decadron IVP as per Md order. pt symptoms resolving. on room air at this time without difficulty. pt lung sounds have stridor throughout. S1S2, on cardiac monitor, VSS. BS +, no pain on palpation to abdomen, skin is intact, no erythema noted. pt throat and face has no evidence of edema. pt undressed and fully assessed head to toe. comfort care in place. mother in waiting room.

## 2018-01-17 NOTE — ED PROVIDER NOTE - ATTENDING CONTRIBUTION TO CARE
42 yof pmhx hypothyroidism on synthoid, psoriasis, autoimmune hepatitis dx on liver bx per pt, recent admission for shortness of breath/ sore throat/ stridor on jan 9th - at that time pt had stridor and was taken to the OR for intubation after ENT scoped pt and thought there may have been some supraglottic swelling and possibly angioedema of airway. pt was intubated and transferred to SICU where she was easily extubated the following day. source of pt's stridor was never definitively found on last admission. pt was d'ed on jan 12th and flew to [place] to visit her boyfriend. states is , and had also just flew from [place] just prior to prev admission as well. no hx of asthma or other airway disease.   today, pt states started having recurrent sob and stridulous resp while flying back from [place] - went to an ouptpt md appt and was sent in because "the doctor didn't like the way I was breathing." denies any f/c, no recent sore throat or uri sxs, denies any cp.     ROS: unobtainable due to acuity    Physical Exam:   constitutional - acutely stridulous, awake and alert, oriented x3, mentating well, satting 100% on ra  head - no external evidence of trauma  cvs - rrr, no murmurs, no peripheral edema  resp - breath sounds clear and equal bilat w transmitted upper airway sounds   gi - abdomen soft and nontender, no rigidity, guarding or rebound, bowel sounds present  msk - moving all extremities spontaneously  neuro - alert and oriented x3, no focal deficits, CNs 2-12 grossly intact  skin- no jaundice, warm and dry  psych - appears anxious  ent - no facial angioedema or visualized angioedema of tongue/ lips/ uvula. no pharyngeal erythema or tonsillar exudates/ adenopathy. no swelling to neck externally.     pt arrived via ems acutely stridulous. given recent hx of intubation and possible angioedema, airway equipment set up, glidescope made available, cric tray at bedside, 02 placed via nrb. decadron given. no hx of hereditary angioedema however ffp ordered to be available at bedside should pt further decompensate. ENT called and at bedside within minutes who scoped without any acute abnormalities of airway seen. pt would intermittently improve then worsen. pt acutely worse after ent scope, so given epi w some improvement. pt able to communicate and laugh w marked improvement when inhaling during laughter. suspect psychogenic component/ anxiety contributing to pt's presentation. however, micu consulted for ongoing stridor. endorsed to dr Rivas at 1800 pending MICU eval. CALISTA Goff MD

## 2018-01-17 NOTE — H&P ADULT - HISTORY OF PRESENT ILLNESS
This is a 42 year old woman with  hypothyroidism, psoriasis (previously on humira), recurrent sinusitis, and recent dx of autoimmune hepatitis based on liver bx (1/2018) presenting w/ "stridors" since yesteray. Pt was just discharged from [facility] ENT service where she was evaluated for laryngeal edema on 1/11/18    Patient was originally admitted to the SICU on 1/09/11  for airway management given scope findings and was intubation in the operating room due to laryngeal edema likely due to angioedema, treated with steroids, found to have a normal larynx and a positive air leak during indirect laryngoscopy.  Pt was successfully extubated within 24 hours without any issues. Pt was discharged home when she has now developed "Stridors every 2 minutes."    Talking makes the stridor worse, however when having a conversation with the patient the stridor goes away completely.    Pt was once again see by ENT today and underwent a repeat scope, which was negative for any source of her stridor. No edema identified as per ENT. It  may be psychogenic in nature given saturation persistently 100% and the nature of the stridoring which may be self induced. This is a 42 year old woman with  hypothyroidism, psoriasis (previously on humira), recurrent sinusitis, and recent dx of autoimmune hepatitis based on liver bx (1/2018) with recent admission 1/9-1/12 for stridor now presenting with recurrent stridor x 2 days. She notes that stridor occurs every 2 minutes, self-resolves. Regarding recent admission, patient was initially seen in ED and scoped by ENT, initial scope c/f suprglottitis. Out of concerns for difficult airway, patient was brought to OR and intubated.  She was the scoped again, the following day and found to have no edema and the diagnosis of angioedema was made, patient was successfully extubated after 24 hours.  Successive scopes have been clear, and ENT scoped patient in ED today and found only mild erytenoid and post-cricoid edema, they noted unlikely to be causing symptoms. In addition, they observed patient in stridor and felt that it was potentially psychogenic.  Patient denies new exposures, fevers, chills.      In ED, Tmax 98.7, HR 71-87, -123/70-90, patient persistently satting at 100 percent despite recurrent episodes of stridor. Labs notable for WBC 6.6, Hgb 13.2, Plt 295, Cr 0.63, LFT with mild transaminitis to AST/ALT 43/48 (improved from prior). CXR clear. Patient received FFP, epi pen, dexamethasone 10mg IVPB. This is a 42 year old woman with  hypothyroidism, psoriasis (previously on humira), recurrent sinusitis, and recent dx of autoimmune hepatitis based on liver bx (1/2018) with recent admission 1/9-1/12 for stridor now presenting with recurrent stridor x 2 days.  She notes that on discharge her symptoms had resolved, but two days ago she began to experience similar, though less severe symptoms to when she was previously admitted.  She notes that sensation of feeling like she is breathing through a straw all the time.  Denies fluctuation in these symptoms, and also notes that symptoms did not improve in ED with steroids, FFP and epi pen. She notes that prio to last admission, she had swollen glands, sore throat and non-productive cough x 2 weeks, though she notes that those symptoms have since resolved. She denies fevers, chills, sick contacts.  Patient denies new exposures (has been living in the same place for years), new medications (humira stopped in November, never on steroids).      Regarding recent admission, patient was initially seen in ED and scoped by ENT, initial scope c/f suprglottitis. Out of concerns for difficult airway, patient was brought to OR and intubated.  She was the scoped again, the following day and found to have no edema and the diagnosis of angioedema was made, patient was successfully extubated after 24 hours.  Successive scopes have been clear, and ENT scoped patient in ED today and found only mild erytenoid and post-cricoid edema, they noted unlikely to be causing symptoms. In addition, they observed patient in stridor and felt that it was potentially psychogenic.      Regarding medical history, patient notes that she was diagnosed with hypothyroidism 10 years ago, initially started on synthroid 75, then down titrated with the addition of cytomel. She was never told the etiology of her hypothyroidism and there is no family history.  She notes that over the last two years, she has had excessive nosebleeds and hearing loss. She saw ENT and was diagnosed with septal abnormalities, underwent 2 cauterizations with resolution of nosebleeds. Regarding hearing loss, she also was given steroid injections to ears and sent to rheum out of concerns for autoimmune process. Per patient, rheum felt she "should not have been sent there"  and their work-up was negative.  Over the summer of 2017, patient was diagnosed with psoriasis, started on humira.  11/2017, patient also developed sinusitis and was started on augmentin.  Soon thereafter, she was found to have severe transaminitis which was thought to be autoimmune, underwent biopsy which confirmed. This was never treated, though transaminitis has resolved.      In social history, patient currently lives with parents and is unemployed. She has no toxic habits, denies depression, anxiety symptoms.      In ED, Tmax 98.7, HR 71-87, -123/70-90, patient persistently satting at 100 percent despite recurrent episodes of stridor. Labs notable for WBC 6.6, Hgb 13.2, Plt 295, Cr 0.63, LFT with mild transaminitis to AST/ALT 43/48 (improved from prior). CXR clear. Patient received FFP, epi pen, dexamethasone 10mg IVPB.

## 2018-01-17 NOTE — ED PROVIDER NOTE - PROGRESS NOTE DETAILS
ENT paged, will be at bedside ENt at bedside, prep for scope ENt at bedside, preparing for laryngoscopy Pt distressed during laryngoscopy, stridorous breathing, saO2 remains 100% (on room air) - Epinephrine 0.3 mg 1:1000 Im admin. Vitals remain stable. Emergent FFP signed, 1 unit for possible angioedema - uncertain if hereditary or acquired. After ENT laryngoscopy - no evidence of significant swelling over the cords/epiglottis - ENT suspects psychogenic etiology, but does agree w/ admission for close monitoring. MICU consult obtained, after eval - does not feel appropriate for ICU level care.

## 2018-01-17 NOTE — CONSULT NOTE ADULT - SUBJECTIVE AND OBJECTIVE BOX
CC: stridor x few hours    HPI: 43yo female with PMHx Autoimmune hepatitis, Psoriasis, Hypothyroid c/o stridor x few hours. Pt was recently discharged 2 days ago for dx of angioedema requiring OR intubation. Upon discharge, FOE showed no evidence of laryngeal edema. Pt was seen and examined at bedside in ED Critical room. She states she was sitting at home when breathing spontaneously became stridorous. She denies any changes in meds. After scoping pt, she immediately became stridorous and anxious, however was still able to carry on conversation and speak in full sentences when asked questions. Pt currently satting 100%. Pt denies fever, chills, n/v, HA, dysphagia, odynophagia, hoarseness.          PAST MEDICAL & SURGICAL HISTORY:  Autoimmune hepatitis  Psoriasis  Pineal gland cyst  Hypothyroid  History of liver biopsy  History of knee surgery    Allergies    No Known Allergies    Intolerances    Augmentin (Hepatotoxicity)  Compazine (Dystonic RXN)  Zofran (Dystonic RXN)    MEDICATIONS  (STANDING):  sodium chloride 0.9%. 1000 milliLiter(s) (100 mL/Hr) IV Continuous <Continuous>    MEDICATIONS  (PRN):      Social History: unknown tobacco use    ROS: ENT, GI, , CV, Pulm, Neuro, Psych, MS, Heme, Endo, Constitutional; all negative except as noted in HPI    Vital Signs Last 24 Hrs  T(C): 36.7 (17 Jan 2018 19:20), Max: 37.1 (17 Jan 2018 18:41)  T(F): 98.1 (17 Jan 2018 19:20), Max: 98.7 (17 Jan 2018 18:41)  HR: 86 (17 Jan 2018 19:20) (71 - 87)  BP: 111/80 (17 Jan 2018 19:20) (104/72 - 123/99)  BP(mean): 101 (17 Jan 2018 16:20) (101 - 108)  RR: 20 (17 Jan 2018 19:20) (17 - 24)  SpO2: 100% (17 Jan 2018 19:20) (97% - 100%)                          13.2   6.6   )-----------( 295      ( 17 Jan 2018 16:20 )             38.6    01-17    143  |  102  |  9   ----------------------------<  103<H>  4.5   |  27  |  0.63    Ca    9.3      17 Jan 2018 16:20    TPro  8.6<H>  /  Alb  4.6  /  TBili  0.2  /  DBili  x   /  AST  43<H>  /  ALT  48<H>  /  AlkPhos  64  01-17   PT/INR - ( 17 Jan 2018 16:20 )   PT: 10.8 sec;   INR: 1.00 ratio         PTT - ( 17 Jan 2018 16:20 )  PTT:22.4 sec    PHYSICAL EXAM:  Gen: NAD, well-developed  Head: Normocephalic, Atraumatic  Face: no edema/erythema/fluctuance, parotid glands soft without mass  Eyes: PERRL, EOMI, no scleral injection  Nose: Nares bilaterally patent, no discharge  Mouth: No Drooling / Trismus.  Mucosa moist, tongue/uvula midline, oropharynx clear  Neck: Flat, supple, no lymphadenopathy, trachea midline, no masses  Resp: intermittent stridor  CV: no peripheral edema/cyanosis    Fiberoptic Indirect laryngoscopy:  (With Scope #2)  + mild arytenoid and post cricoid edema, airway widely patent, tracheal rings visible below cords, no subglottic stenosis. Nasopharynx, oropharynx, and hypopharynx clear, no bleeding, no foreign body visualized. No erythema, pooling of secretions, masses or lesions. Vocal cords mobile with good contact b/l.

## 2018-01-17 NOTE — ED ADULT NURSE REASSESSMENT NOTE - NS ED NURSE REASSESS COMMENT FT1
pt O2 sat 100% RA, airway patent, respirations nonlabored, oxygenating well. VSS. MD Rivas aware. Pt resting, mom at bedside. pending repeat VBG and dispo. Safety and comfort provided/maintained. pt O2 sat 100% RA, respirations nonlabored, oxygenating well. VSS. MD Rivas aware. Pt resting, mom at bedside. pending repeat VBG and dispo. Safety and comfort provided/maintained.

## 2018-01-17 NOTE — CONSULT NOTE ADULT - ASSESSMENT
43 y/o M w/ a pmhx significant for hypothyroidism, psoriasis (previously on humira), recurrent sinusitis, and recent dx of autoimmune hepatitis based on liver bx (1/2018) presenting w/ "stridors" since yesterday with a negative laryngoscope    #Neuro  - a&o x 4     #Cardiac  - normal    #Respiratory--Stridor  - negative laryngoscope by ENT today, no evidence of angioedema  - given decadron in ED 10mg x1 and IM epi pen x 1  - saturating 100%  on face mask, can titrate down as tolerated  - if monitoring required can consider ENT service given admission 5 days ago  - no wheezing on exam, would hold off on albuterol     #GI  - stable    #psych  - symptoms may be psychogenic in nature, pt would benefit from psych eval in house or as an oupt  - check Utox     #Dispo  - not a MICU candidate at this time given overall stability of pt and lack of findings on scope by ENT  - reconsult as needed    Anderson, PGY III  [id number] 43 y/o M w/ a pmhx significant for hypothyroidism, psoriasis (previously on humira), recurrent sinusitis, and recent dx of autoimmune hepatitis based on liver bx (1/2018) presenting w/ "stridors" since yesterday with a negative laryngoscope    #Neuro  - a&o x 4     #Cardiac  - normal    #Respiratory--Stridor  - negative laryngoscope by ENT today, no evidence of angioedema  - given decadron in ED 10mg x1 and IM epi pen x 1  - saturating 100%  on face mask, can titrate down as tolerated  - if monitoring required can consider ENT service given admission 5 days ago  - no wheezing on exam, would hold off on albuterol     #GI  - transaminitis improving    #psych  - symptoms may be psychogenic in nature, pt would benefit from psych eval in house or as an oupt  - check Utox     #Dispo  - not a MICU candidate at this time given overall stability of pt and lack of findings on scope by ENT  - reconsult as needed    Anderson, PGY III  [id number] 43 y/o F w/ a pmhx significant for hypothyroidism, psoriasis (previously on humira), recurrent sinusitis, and recent dx of autoimmune hepatitis based on liver bx (1/2018) presenting w/ "stridors" since yesterday with a negative laryngoscope    #Neuro  - a&o x 4     #Cardiac  - normal    #Respiratory--Stridor  - negative laryngoscope by ENT today, no evidence of angioedema  - given decadron in ED 10mg x1 and IM epi pen x 1  - saturating 100%  on face mask, can titrate down as tolerated  - if monitoring required can consider ENT service given admission 5 days ago  - no wheezing on exam, would hold off on albuterol     #GI  - transaminitis improving    #psych  - symptoms may be psychogenic in nature, pt would benefit from psych eval in house or as an oupt  - check Utox     #Dispo  - not a MICU candidate at this time given overall stability of pt and lack of findings on scope by ENT  - reconsult as needed    Anderson, PGY III  [id number]

## 2018-01-17 NOTE — ED ADULT NURSE NOTE - CHPI ED SYMPTOMS NEG
no body aches/no cough/no diaphoresis/no hemoptysis/no headache/no chest pain/no wheezing/no edema/no fever

## 2018-01-17 NOTE — ED PROVIDER NOTE - OBJECTIVE STATEMENT
43 yo F recently d/c for dx of angioedema req. OR intubation who reports return of stridorous breathing a few hours ago. Pt. was sent home, no recent changes in her medications, denies fevers, chills, cough, chest pain since being discharged. 43 yo F recently d/c for dx of angioedema req. OR intubation who reports return of stridorous breathing a few hours ago. Pt. was sent home, no recent changes in her medications, denies fevers, chills, cough, chest pain since being discharged. Per prior records, angioedema high on the differential diagnosis - unclear if testing/evaluation for hereditary vs acquired. Pt denies a prior hx of allergic reactions, angioedema, airway issues, atopy prior to last episode.

## 2018-01-17 NOTE — ED ADULT NURSE REASSESSMENT NOTE - NS ED NURSE REASSESS COMMENT FT1
patient had an episode of dizziness while feeling weak and near syncope.   MD Rivas and Kam at the bedside. fluids increased and patient sitting up. increased dizziness with position changes. vital signs repeated and stable.   pt states that her breathing has remained the same but worsening when talking. stridor heard on assessment. md aware and at bedside.   remains 99%-100% on room air.

## 2018-01-17 NOTE — H&P ADULT - NSHPLABSRESULTS_GEN_ALL_CORE
Labs personally reviewed.                        13.2   6.6   )-----------( 295      ( 17 Jan 2018 16:20 )             38.6     01-17    143  |  102  |  9   ----------------------------<  103<H>  4.5   |  27  |  0.63    Ca    9.3      17 Jan 2018 16:20    TPro  8.6<H>  /  Alb  4.6  /  TBili  0.2  /  DBili  x   /  AST  43<H>  /  ALT  48<H>  /  AlkPhos  64  01-17    LIVER FUNCTIONS - ( 17 Jan 2018 16:20 )  Alb: 4.6 g/dL / Pro: 8.6 g/dL / ALK PHOS: 64 U/L / ALT: 48 U/L RC / AST: 43 U/L / GGT: x           PT/INR - ( 17 Jan 2018 16:20 )   PT: 10.8 sec;   INR: 1.00 ratio      PTT - ( 17 Jan 2018 16:20 )  PTT:22.4 sec    Imaging personally reviewed.      Tracing personally reviewed.

## 2018-01-17 NOTE — H&P ADULT - NSHPPHYSICALEXAM_GEN_ALL_CORE
GENERAL: NAD, well-developed  HEAD:  atraumatic, normocephalic  ENT: EOMI, PERRLA, conjunctiva and sclera clear, neck supple, no JVD, moist mucosa, no LAD, no thyromegaly  CHEST/LUNG: CTAB; no wheezes, rales, rhonchi  BACK: no spinal tenderness  HEART: RRR, no murmurs, rubs, or gallops  ABDOMEN: NABS, soft, nontender, nondistended  EXTREMITIES:  no clubbing, cyanosis, or edema  PSYCH: normal behavior, normal affect, euthymic  NEUROLOGY: AAOx3, non-focal, CN 2-12 intact  SKIN: normal color, no rashes or lesions GENERAL: NAD, well-developed, no respiratory distress or audible stridor  HEAD:  atraumatic, normocephalic  ENT: EOMI, PERRLA, conjunctiva and sclera clear, neck supple, no JVD, moist mucosa, no LAD, no thyromegaly  CHEST/LUNG: patient not able to take deep breath, though does not seem to be participating in exam. Lungs clear by my exam, no wheezing or stridor.  BACK: no spinal tenderness  HEART: RRR, no murmurs, rubs, or gallops  ABDOMEN: NABS, soft, nontender, nondistended  EXTREMITIES:  no clubbing, cyanosis, or edema  PSYCH: normal behavior, normal affect, euthymic  NEUROLOGY: AAOx3, non-focal, CN 2-12 intact  SKIN: normal color, no rashes or lesions

## 2018-01-17 NOTE — CONSULT NOTE ADULT - ASSESSMENT
43yo female c/o stridor. Laryngoscopy showing mild arytenoid and post cricoid edema, however, airway widely patent.

## 2018-01-18 DIAGNOSIS — F43.20 ADJUSTMENT DISORDER, UNSPECIFIED: ICD-10-CM

## 2018-01-18 DIAGNOSIS — R42 DIZZINESS AND GIDDINESS: ICD-10-CM

## 2018-01-18 LAB
ALBUMIN SERPL ELPH-MCNC: 3.7 G/DL — SIGNIFICANT CHANGE UP (ref 3.3–5)
ALP SERPL-CCNC: 50 U/L — SIGNIFICANT CHANGE UP (ref 40–120)
ALT FLD-CCNC: 32 U/L — SIGNIFICANT CHANGE UP (ref 10–45)
ANION GAP SERPL CALC-SCNC: 14 MMOL/L — SIGNIFICANT CHANGE UP (ref 5–17)
AST SERPL-CCNC: 26 U/L — SIGNIFICANT CHANGE UP (ref 10–40)
BASOPHILS # BLD AUTO: 0 K/UL — SIGNIFICANT CHANGE UP (ref 0–0.2)
BASOPHILS NFR BLD AUTO: 0 % — SIGNIFICANT CHANGE UP (ref 0–2)
BILIRUB SERPL-MCNC: 0.3 MG/DL — SIGNIFICANT CHANGE UP (ref 0.2–1.2)
BUN SERPL-MCNC: 14 MG/DL — SIGNIFICANT CHANGE UP (ref 7–23)
CALCIUM SERPL-MCNC: 8.5 MG/DL — SIGNIFICANT CHANGE UP (ref 8.4–10.5)
CHLORIDE SERPL-SCNC: 103 MMOL/L — SIGNIFICANT CHANGE UP (ref 96–108)
CO2 SERPL-SCNC: 23 MMOL/L — SIGNIFICANT CHANGE UP (ref 22–31)
CREAT SERPL-MCNC: 0.61 MG/DL — SIGNIFICANT CHANGE UP (ref 0.5–1.3)
EOSINOPHIL # BLD AUTO: 0 K/UL — SIGNIFICANT CHANGE UP (ref 0–0.5)
EOSINOPHIL NFR BLD AUTO: 0 % — SIGNIFICANT CHANGE UP (ref 0–6)
GLUCOSE SERPL-MCNC: 124 MG/DL — HIGH (ref 70–99)
HCT VFR BLD CALC: 29.2 % — LOW (ref 34.5–45)
HCT VFR BLD CALC: 31.3 % — LOW (ref 34.5–45)
HGB BLD-MCNC: 10.1 G/DL — LOW (ref 11.5–15.5)
HGB BLD-MCNC: 9.6 G/DL — LOW (ref 11.5–15.5)
IMM GRANULOCYTES NFR BLD AUTO: 0.5 % — SIGNIFICANT CHANGE UP (ref 0–1.5)
LYMPHOCYTES # BLD AUTO: 0.96 K/UL — LOW (ref 1–3.3)
LYMPHOCYTES # BLD AUTO: 15.6 % — SIGNIFICANT CHANGE UP (ref 13–44)
MCHC RBC-ENTMCNC: 26.9 PG — LOW (ref 27–34)
MCHC RBC-ENTMCNC: 30.1 PG — SIGNIFICANT CHANGE UP (ref 27–34)
MCHC RBC-ENTMCNC: 30.7 GM/DL — LOW (ref 32–36)
MCHC RBC-ENTMCNC: 34.7 GM/DL — SIGNIFICANT CHANGE UP (ref 32–36)
MCV RBC AUTO: 86.9 FL — SIGNIFICANT CHANGE UP (ref 80–100)
MCV RBC AUTO: 87.7 FL — SIGNIFICANT CHANGE UP (ref 80–100)
MONOCYTES # BLD AUTO: 0.32 K/UL — SIGNIFICANT CHANGE UP (ref 0–0.9)
MONOCYTES NFR BLD AUTO: 5.2 % — SIGNIFICANT CHANGE UP (ref 2–14)
NEUTROPHILS # BLD AUTO: 4.85 K/UL — SIGNIFICANT CHANGE UP (ref 1.8–7.4)
NEUTROPHILS NFR BLD AUTO: 78.7 % — HIGH (ref 43–77)
PLATELET # BLD AUTO: 204 K/UL — SIGNIFICANT CHANGE UP (ref 150–400)
PLATELET # BLD AUTO: 232 K/UL — SIGNIFICANT CHANGE UP (ref 150–400)
POTASSIUM SERPL-MCNC: 4.1 MMOL/L — SIGNIFICANT CHANGE UP (ref 3.5–5.3)
POTASSIUM SERPL-SCNC: 4.1 MMOL/L — SIGNIFICANT CHANGE UP (ref 3.5–5.3)
PROT SERPL-MCNC: 6.9 G/DL — SIGNIFICANT CHANGE UP (ref 6–8.3)
RBC # BLD: 3.36 M/UL — LOW (ref 3.8–5.2)
RBC # BLD: 3.57 M/UL — LOW (ref 3.8–5.2)
RBC # FLD: 15.7 % — HIGH (ref 10.3–14.5)
RBC # FLD: 17.8 % — HIGH (ref 10.3–14.5)
SODIUM SERPL-SCNC: 140 MMOL/L — SIGNIFICANT CHANGE UP (ref 135–145)
WBC # BLD: 6.16 K/UL — SIGNIFICANT CHANGE UP (ref 3.8–10.5)
WBC # BLD: 8.7 K/UL — SIGNIFICANT CHANGE UP (ref 3.8–10.5)
WBC # FLD AUTO: 6.16 K/UL — SIGNIFICANT CHANGE UP (ref 3.8–10.5)
WBC # FLD AUTO: 8.7 K/UL — SIGNIFICANT CHANGE UP (ref 3.8–10.5)

## 2018-01-18 PROCEDURE — 99233 SBSQ HOSP IP/OBS HIGH 50: CPT

## 2018-01-18 PROCEDURE — 99222 1ST HOSP IP/OBS MODERATE 55: CPT | Mod: GC

## 2018-01-18 RX ORDER — MECLIZINE HCL 12.5 MG
25 TABLET ORAL ONCE
Qty: 0 | Refills: 0 | Status: COMPLETED | OUTPATIENT
Start: 2018-01-18 | End: 2018-01-18

## 2018-01-18 RX ORDER — PANTOPRAZOLE SODIUM 20 MG/1
40 TABLET, DELAYED RELEASE ORAL ONCE
Qty: 0 | Refills: 0 | Status: COMPLETED | OUTPATIENT
Start: 2018-01-18 | End: 2018-01-18

## 2018-01-18 RX ORDER — PANTOPRAZOLE SODIUM 20 MG/1
40 TABLET, DELAYED RELEASE ORAL
Qty: 0 | Refills: 0 | Status: DISCONTINUED | OUTPATIENT
Start: 2018-01-19 | End: 2018-01-19

## 2018-01-18 RX ORDER — DEXAMETHASONE 0.5 MG/5ML
6 ELIXIR ORAL EVERY 8 HOURS
Qty: 0 | Refills: 0 | Status: DISCONTINUED | OUTPATIENT
Start: 2018-01-18 | End: 2018-01-19

## 2018-01-18 RX ORDER — MECLIZINE HCL 12.5 MG
25 TABLET ORAL EVERY 8 HOURS
Qty: 0 | Refills: 0 | Status: DISCONTINUED | OUTPATIENT
Start: 2018-01-18 | End: 2018-01-19

## 2018-01-18 RX ADMIN — LIOTHYRONINE SODIUM 5 MICROGRAM(S): 25 TABLET ORAL at 05:10

## 2018-01-18 RX ADMIN — Medication 25 MILLIGRAM(S): at 16:18

## 2018-01-18 RX ADMIN — Medication 6 MILLIGRAM(S): at 21:32

## 2018-01-18 RX ADMIN — Medication 6 MILLIGRAM(S): at 13:29

## 2018-01-18 RX ADMIN — PANTOPRAZOLE SODIUM 40 MILLIGRAM(S): 20 TABLET, DELAYED RELEASE ORAL at 15:34

## 2018-01-18 RX ADMIN — Medication 25 MILLIGRAM(S): at 01:15

## 2018-01-18 RX ADMIN — ENOXAPARIN SODIUM 40 MILLIGRAM(S): 100 INJECTION SUBCUTANEOUS at 05:11

## 2018-01-18 RX ADMIN — Medication 50 MICROGRAM(S): at 05:10

## 2018-01-18 NOTE — BEHAVIORAL HEALTH ASSESSMENT NOTE - HPI (INCLUDE ILLNESS QUALITY, SEVERITY, DURATION, TIMING, CONTEXT, MODIFYING FACTORS, ASSOCIATED SIGNS AND SYMPTOMS)
Pt is a 42 y.o. F with a PMH hypothyroidism, psoriasis (previously on humira), recurrent sinusitis, and recent dx of autoimmune hepatitis with recent admission 1/9-1/12 for stridor admitted for recurrent stridor. Psychiatry consulted for anxiety and psychosomatic symptoms, pt unaware psych was consulted. Pt went to see her hepatologist to follow up for her recently diagnosed autoimmune hepatitis. Shortly before her appointment, she began to have stridor to the point where the hepatologist immediately called EMS. She was given decadron yesterday at around 4PM, which she says helped reduce her symptoms significantly. Pt notes that this has never happened to her before until the recent admission and she did very well on steroids but was not discharged with any medications for acute exacerbations. She is not an anxious person at baseline and was returning from vacation when she first experienced stridor. Denies symptoms of depression and anxiety but does feel concerned that her treatment plan has not been explained to her yet. Denies audio/visual/tactile hallucinations and suicidal/homicidal ideation.

## 2018-01-18 NOTE — BEHAVIORAL HEALTH ASSESSMENT NOTE - SUMMARY
This is a 42 y.o. CF pt. with a PMH hypothyroidism, psoriasis (previously on humira), recurrent sinusitis, and recent dx of autoimmune hepatitis with recent admission 1/9-1/12 for stridor admitted for recurrent stridor. Psychiatry consulted for anxiety and psychosomatic symptoms, pt unaware psych was consulted. Pt is not currently experiencing any symptoms of anxiety except that she is concerned that she does not know the treatment plan yet. She does not have any other symptoms of any other mood disorders or adjustment disorder either.

## 2018-01-18 NOTE — BEHAVIORAL HEALTH ASSESSMENT NOTE - NSBHCHARTREVIEWINVESTIGATE_PSY_A_CORE FT
< from: 12 Lead ECG (01.17.18 @ 15:54) >    Ventricular Rate 77 BPM    Atrial Rate 77 BPM    P-R Interval 154 ms    QRS Duration 82 ms     ms    QTc 443 ms    P Axis 22 degrees    R Axis 27 degrees    T Axis 48 degrees    Diagnosis Line NORMAL SINUS RHYTHM  NORMAL ECG    < end of copied text >

## 2018-01-18 NOTE — BEHAVIORAL HEALTH ASSESSMENT NOTE - NSBHCHARTREVIEWIMAGING_PSY_A_CORE FT
< from: MRI Head w/wo Cont (07.16.16 @ 15:20) >    IMPRESSION: No evidence for intracranial mass, infarct, hemorrhage, or   hydrocephalus.    < end of copied text >

## 2018-01-18 NOTE — PROGRESS NOTE ADULT - ASSESSMENT
This is a 42 year old woman with  hypothyroidism, psoriasis (previously on humira), recurrent sinusitis, and recent dx of autoimmune hepatitis based on liver bx (1/2018) with recent admission 1/9-1/12 for stridor now presenting with recurrent stridor x 2 days. This is a 42 year old woman with  hypothyroidism, psoriasis (previously on humira), recurrent sinusitis, and recent dx of autoimmune hepatitis based on liver bx (1/2018) with recent admission 1/9-1/12 for stridor now presenting with recurrent stridor x 2 days improved.

## 2018-01-18 NOTE — PROGRESS NOTE ADULT - PROBLEM SELECTOR PLAN 1
--unclear etiology  --DDx includes angioedema (less likely given normal exam today despite persistent symptoms) vs. vocal cord dysfunction (also not observed on scope) vs. psychogenic  --less likely epiglottitis/supraglottitis given rapid resolution of edema on prior admission  --also arguing against edema is the fact that patient never desaturated today or on prior admission despite ssubjective distress  --patient is s/p FFP, dexamethosone 10mg IVPD and epipen  --will maintain epi pen at bedside shoulde symptoms recur  --holding continued steroids given benign exam  --appreciate ENT assistance, will continue to engage them overnight should symptoms worsen --currently resolved s/p dexa 10mg and epinephrine yesterday, unclear etiology, currently no oropharyngeal swelling or evidence of angioedema, 02 sat 100%, no sob or wheezing. Consider silent reflux vs recent UTI vs unlikely psychogenic   --ENT shows mild arytenoid and post cricoid edema, however, airway widely patent.   -discussed with ENT, rec IV decadron 6mg q8 and transition to medrol pack tomorrow and PPI for discharge ayaz if stable  -f/u ENT attending recs  -appreciate psych recs - no interventions --currently resolved s/p dexa 10mg and epinephrine yesterday, unclear etiology, currently no oropharyngeal swelling or evidence of angioedema, 02 sat 100%, no sob or wheezing. Consider silent reflux vs recent URI vs unlikely psychogenic   --ENT shows mild arytenoid and post cricoid edema, however, airway widely patent.   -discussed with ENT, rec IV decadron 6mg q8 and transition to medrol pack tomorrow and PPI for discharge ayaz if stable  -f/u ENT attending recs  -appreciate psych recs - no interventions

## 2018-01-18 NOTE — PROGRESS NOTE ADULT - PROBLEM SELECTOR PLAN 2
--continue synthroid 50ug, cytomel 5mg symptoms consistent with vertigo  orthostatic negative  c/w PO meclizine 25mg q8 prn  d/c IVF

## 2018-01-18 NOTE — BEHAVIORAL HEALTH ASSESSMENT NOTE - NSBHCHARTREVIEWLAB_PSY_A_CORE FT
10.1   8.7   )-----------( 204      ( 18 Jan 2018 13:58 )             29.2     01-18    140  |  103  |  14  ----------------------------<  124<H>  4.1   |  23  |  0.61    Ca    8.5      18 Jan 2018 10:35    TPro  6.9  /  Alb  3.7  /  TBili  0.3  /  DBili  x   /  AST  26  /  ALT  32  /  AlkPhos  50  01-18

## 2018-01-18 NOTE — BEHAVIORAL HEALTH ASSESSMENT NOTE - NSBHCHARTREVIEWVS_PSY_A_CORE FT
Vital Signs Last 24 Hrs  T(C): 36.8 (18 Jan 2018 14:46), Max: 37.3 (17 Jan 2018 21:22)  T(F): 98.3 (18 Jan 2018 14:46), Max: 99.1 (17 Jan 2018 21:22)  HR: 75 (18 Jan 2018 05:27) (71 - 87)  BP: 109/71 (18 Jan 2018 05:27) (97/67 - 123/99)  BP(mean): 101 (17 Jan 2018 16:20) (101 - 108)  RR: 18 (18 Jan 2018 14:46) (17 - 24)  SpO2: 99% (18 Jan 2018 14:46) (97% - 100%)

## 2018-01-18 NOTE — BEHAVIORAL HEALTH ASSESSMENT NOTE - AXIS III
hypothyroidism, psoriasis (previously on humira), recurrent sinusitis, and recent dx of autoimmune hepatitis with recent admission 1/9-1/12 for stridor admitted for recurrent stridor.

## 2018-01-18 NOTE — PROGRESS NOTE ADULT - PROBLEM SELECTOR PLAN 3
--continue to trend LFTs  --out-patient follow-up --continue synthroid 50ug and cytomel 5mg  - TFT wnl

## 2018-01-18 NOTE — PROGRESS NOTE ADULT - SUBJECTIVE AND OBJECTIVE BOX
Patient is a 42y old  Female who presents with a chief complaint of     SUBJECTIVE / OVERNIGHT EVENTS: Patient says her "stridor" has resolved and she doesn't have any weird throat tightness. She feeds like she could not take a very deep breath. Denies chest pain and sob. Still complains of dizziness which she describes like she is spinning, not orthostatic. Denies fever, throat swelling, dysphagia     MEDICATIONS  (STANDING):  dexamethasone  Injectable 6 milliGRAM(s) IV Push every 8 hours  enoxaparin Injectable 40 milliGRAM(s) SubCutaneous every 24 hours  levothyroxine 50 MICROGram(s) Oral daily  liothyronine 5 MICROGram(s) Oral daily  pantoprazole    Tablet 40 milliGRAM(s) Oral once  sodium chloride 0.9%. 1000 milliLiter(s) (100 mL/Hr) IV Continuous <Continuous>    MEDICATIONS  (PRN):  meclizine 25 milliGRAM(s) Oral every 8 hours PRN Dizziness      Vital Signs Last 24 Hrs  T(C): 36.8 (18 Jan 2018 14:46), Max: 37.3 (17 Jan 2018 21:22)  T(F): 98.3 (18 Jan 2018 14:46), Max: 99.1 (17 Jan 2018 21:22)  HR: 75 (18 Jan 2018 05:27) (71 - 87)  BP: 109/71 (18 Jan 2018 05:27) (97/67 - 123/99)  BP(mean): 101 (17 Jan 2018 16:20) (101 - 108)  RR: 18 (18 Jan 2018 14:46) (17 - 24)  SpO2: 99% (18 Jan 2018 14:46) (97% - 100%)  CAPILLARY BLOOD GLUCOSE      POCT Blood Glucose.: 140 mg/dL (17 Jan 2018 19:12)    I&O's Summary    17 Jan 2018 07:01  -  18 Jan 2018 07:00  --------------------------------------------------------  IN: 1480 mL / OUT: 0 mL / NET: 1480 mL    18 Jan 2018 07:01  -  18 Jan 2018 15:31  --------------------------------------------------------  IN: 280 mL / OUT: 0 mL / NET: 280 mL        PHYSICAL EXAM:  GENERAL: NAD, well-developed  HEAD:  Atraumatic, Normocephalic  EYES: EOMI, PERRLA, conjunctiva and sclera clear  NECK: Supple, No JVD  Mouth: no oropharyngeal tongue, or lip swelling  CHEST/LUNG: Clear to auscultation bilaterally; No wheeze  HEART: Regular rate and rhythm; No murmurs, rubs, or gallops  ABDOMEN: Soft, Nontender, Nondistended; Bowel sounds present  EXTREMITIES:  2+ Peripheral Pulses, No clubbing, cyanosis, or edema  PSYCH: AAOx3  NEUROLOGY: non-focal  SKIN: No rashes or lesions    LABS:                        10.1   8.7   )-----------( 204      ( 18 Jan 2018 13:58 )             29.2     01-18    140  |  103  |  14  ----------------------------<  124<H>  4.1   |  23  |  0.61    Ca    8.5      18 Jan 2018 10:35    TPro  6.9  /  Alb  3.7  /  TBili  0.3  /  DBili  x   /  AST  26  /  ALT  32  /  AlkPhos  50  01-18    PT/INR - ( 17 Jan 2018 16:20 )   PT: 10.8 sec;   INR: 1.00 ratio    PTT - ( 17 Jan 2018 16:20 )  PTT:22.4 sec              RADIOLOGY & ADDITIONAL TESTS:    Imaging Personally Reviewed:    Consultant(s) Notes Reviewed:  ENT    Care Discussed with Consultants/Other Providers: ENT as below Patient is a 42y old  Female who presents with a chief complaint of     SUBJECTIVE / OVERNIGHT EVENTS: Patient says her "stridor" has resolved and she doesn't have any weird throat tightness. She feeds like she could not take a very deep breath. Denies chest pain and sob. Still complains of dizziness which she describes like she is spinning, not orthostatic. Denies fever, throat swelling, dysphagia     MEDICATIONS  (STANDING):  dexamethasone  Injectable 6 milliGRAM(s) IV Push every 8 hours  enoxaparin Injectable 40 milliGRAM(s) SubCutaneous every 24 hours  levothyroxine 50 MICROGram(s) Oral daily  liothyronine 5 MICROGram(s) Oral daily  pantoprazole    Tablet 40 milliGRAM(s) Oral once  sodium chloride 0.9%. 1000 milliLiter(s) (100 mL/Hr) IV Continuous <Continuous>    MEDICATIONS  (PRN):  meclizine 25 milliGRAM(s) Oral every 8 hours PRN Dizziness      Vital Signs Last 24 Hrs  T(C): 36.8 (18 Jan 2018 14:46), Max: 37.3 (17 Jan 2018 21:22)  T(F): 98.3 (18 Jan 2018 14:46), Max: 99.1 (17 Jan 2018 21:22)  HR: 75 (18 Jan 2018 05:27) (71 - 87)  BP: 109/71 (18 Jan 2018 05:27) (97/67 - 123/99)  BP(mean): 101 (17 Jan 2018 16:20) (101 - 108)  RR: 18 (18 Jan 2018 14:46) (17 - 24)  SpO2: 99% (18 Jan 2018 14:46) (97% - 100%)  CAPILLARY BLOOD GLUCOSE      POCT Blood Glucose.: 140 mg/dL (17 Jan 2018 19:12)    I&O's Summary    17 Jan 2018 07:01  -  18 Jan 2018 07:00  --------------------------------------------------------  IN: 1480 mL / OUT: 0 mL / NET: 1480 mL    18 Jan 2018 07:01  -  18 Jan 2018 15:31  --------------------------------------------------------  IN: 280 mL / OUT: 0 mL / NET: 280 mL        PHYSICAL EXAM:  GENERAL: NAD, well-developed  HEAD:  Atraumatic, Normocephalic  EYES: EOMI, PERRLA, conjunctiva and sclera clear  NECK: Supple, No JVD  Mouth: no oropharyngeal tongue, or lip swelling  CHEST/LUNG: Clear to auscultation bilaterally; No wheeze  HEART: Regular rate and rhythm; No murmurs, rubs, or gallops  ABDOMEN: Soft, Nontender, Nondistended; Bowel sounds present  EXTREMITIES:  2+ Peripheral Pulses, No clubbing, cyanosis, or edema  PSYCH: AAOx3  NEUROLOGY: non-focal  SKIN: No rashes or lesions    LABS:                        10.1   8.7   )-----------( 204      ( 18 Jan 2018 13:58 )             29.2     01-18    140  |  103  |  14  ----------------------------<  124<H>  4.1   |  23  |  0.61    Ca    8.5      18 Jan 2018 10:35    TPro  6.9  /  Alb  3.7  /  TBili  0.3  /  DBili  x   /  AST  26  /  ALT  32  /  AlkPhos  50  01-18    PT/INR - ( 17 Jan 2018 16:20 )   PT: 10.8 sec;   INR: 1.00 ratio    PTT - ( 17 Jan 2018 16:20 )  PTT:22.4 sec              RADIOLOGY & ADDITIONAL TESTS:    Imaging Personally Reviewed:    Consultant(s) Notes Reviewed:  ENT, psych    Care Discussed with Consultants/Other Providers: ENT and psych Dr Humerovic - no interventinos as below Patient is a 42y old  Female who presents with a chief complaint of stridor    SUBJECTIVE / OVERNIGHT EVENTS: Patient says her "stridor" has resolved and she doesn't have any weird throat tightness. She feeds like she could not take a very deep breath. Denies chest pain and sob. Still complains of dizziness which she describes like she is spinning, not orthostatic. Denies fever, throat swelling, dysphagia     MEDICATIONS  (STANDING):  dexamethasone  Injectable 6 milliGRAM(s) IV Push every 8 hours  enoxaparin Injectable 40 milliGRAM(s) SubCutaneous every 24 hours  levothyroxine 50 MICROGram(s) Oral daily  liothyronine 5 MICROGram(s) Oral daily  pantoprazole    Tablet 40 milliGRAM(s) Oral once  sodium chloride 0.9%. 1000 milliLiter(s) (100 mL/Hr) IV Continuous <Continuous>    MEDICATIONS  (PRN):  meclizine 25 milliGRAM(s) Oral every 8 hours PRN Dizziness      Vital Signs Last 24 Hrs  T(C): 36.8 (18 Jan 2018 14:46), Max: 37.3 (17 Jan 2018 21:22)  T(F): 98.3 (18 Jan 2018 14:46), Max: 99.1 (17 Jan 2018 21:22)  HR: 75 (18 Jan 2018 05:27) (71 - 87)  BP: 109/71 (18 Jan 2018 05:27) (97/67 - 123/99)  BP(mean): 101 (17 Jan 2018 16:20) (101 - 108)  RR: 18 (18 Jan 2018 14:46) (17 - 24)  SpO2: 99% (18 Jan 2018 14:46) (97% - 100%)  CAPILLARY BLOOD GLUCOSE      POCT Blood Glucose.: 140 mg/dL (17 Jan 2018 19:12)    I&O's Summary    17 Jan 2018 07:01  -  18 Jan 2018 07:00  --------------------------------------------------------  IN: 1480 mL / OUT: 0 mL / NET: 1480 mL    18 Jan 2018 07:01  -  18 Jan 2018 15:31  --------------------------------------------------------  IN: 280 mL / OUT: 0 mL / NET: 280 mL        PHYSICAL EXAM:  GENERAL: NAD, well-developed  HEAD:  Atraumatic, Normocephalic  EYES: EOMI, PERRLA, conjunctiva and sclera clear  NECK: Supple, No JVD  Mouth: no oropharyngeal tongue, or lip swelling  CHEST/LUNG: Clear to auscultation bilaterally; No wheeze  HEART: Regular rate and rhythm; No murmurs, rubs, or gallops  ABDOMEN: Soft, Nontender, Nondistended; Bowel sounds present  EXTREMITIES:  2+ Peripheral Pulses, No clubbing, cyanosis, or edema  PSYCH: AAOx3  NEUROLOGY: non-focal  SKIN: No rashes or lesions    LABS:                        10.1   8.7   )-----------( 204      ( 18 Jan 2018 13:58 )             29.2     01-18    140  |  103  |  14  ----------------------------<  124<H>  4.1   |  23  |  0.61    Ca    8.5      18 Jan 2018 10:35    TPro  6.9  /  Alb  3.7  /  TBili  0.3  /  DBili  x   /  AST  26  /  ALT  32  /  AlkPhos  50  01-18    PT/INR - ( 17 Jan 2018 16:20 )   PT: 10.8 sec;   INR: 1.00 ratio    PTT - ( 17 Jan 2018 16:20 )  PTT:22.4 sec              RADIOLOGY & ADDITIONAL TESTS:    Imaging Personally Reviewed:    Consultant(s) Notes Reviewed:  ENT, psych    Care Discussed with Consultants/Other Providers: ENT and psych Dr Humerovic - no interventinos as below

## 2018-01-19 ENCOUNTER — TRANSCRIPTION ENCOUNTER (OUTPATIENT)
Age: 43
End: 2018-01-19

## 2018-01-19 VITALS — OXYGEN SATURATION: 99 % | TEMPERATURE: 98 F | RESPIRATION RATE: 18 BRPM

## 2018-01-19 DIAGNOSIS — R42 DIZZINESS AND GIDDINESS: ICD-10-CM

## 2018-01-19 LAB
ANION GAP SERPL CALC-SCNC: 13 MMOL/L — SIGNIFICANT CHANGE UP (ref 5–17)
BUN SERPL-MCNC: 6 MG/DL — LOW (ref 7–23)
C1INH FUNCTIONAL FLD-MCNC: 83 — SIGNIFICANT CHANGE UP
C1INH FUNCTIONAL/C1INH TOTAL MFR SERPL: 32 MG/DL — SIGNIFICANT CHANGE UP (ref 21–39)
CALCIUM SERPL-MCNC: 8.7 MG/DL — SIGNIFICANT CHANGE UP (ref 8.4–10.5)
CHLORIDE SERPL-SCNC: 104 MMOL/L — SIGNIFICANT CHANGE UP (ref 96–108)
CO2 SERPL-SCNC: 23 MMOL/L — SIGNIFICANT CHANGE UP (ref 22–31)
CREAT SERPL-MCNC: 0.59 MG/DL — SIGNIFICANT CHANGE UP (ref 0.5–1.3)
GLUCOSE SERPL-MCNC: 135 MG/DL — HIGH (ref 70–99)
HCT VFR BLD CALC: 30.3 % — LOW (ref 34.5–45)
HGB BLD-MCNC: 9.6 G/DL — LOW (ref 11.5–15.5)
MCHC RBC-ENTMCNC: 27.5 PG — SIGNIFICANT CHANGE UP (ref 27–34)
MCHC RBC-ENTMCNC: 31.7 GM/DL — LOW (ref 32–36)
MCV RBC AUTO: 86.8 FL — SIGNIFICANT CHANGE UP (ref 80–100)
PLATELET # BLD AUTO: 215 K/UL — SIGNIFICANT CHANGE UP (ref 150–400)
POTASSIUM SERPL-MCNC: 4.1 MMOL/L — SIGNIFICANT CHANGE UP (ref 3.5–5.3)
POTASSIUM SERPL-SCNC: 4.1 MMOL/L — SIGNIFICANT CHANGE UP (ref 3.5–5.3)
RBC # BLD: 3.49 M/UL — LOW (ref 3.8–5.2)
RBC # FLD: 17.2 % — HIGH (ref 10.3–14.5)
SODIUM SERPL-SCNC: 140 MMOL/L — SIGNIFICANT CHANGE UP (ref 135–145)
WBC # BLD: 5.98 K/UL — SIGNIFICANT CHANGE UP (ref 3.8–10.5)
WBC # FLD AUTO: 5.98 K/UL — SIGNIFICANT CHANGE UP (ref 3.8–10.5)

## 2018-01-19 PROCEDURE — 99239 HOSP IP/OBS DSCHRG MGMT >30: CPT

## 2018-01-19 RX ORDER — MECLIZINE HCL 12.5 MG
1 TABLET ORAL
Qty: 90 | Refills: 0 | OUTPATIENT
Start: 2018-01-19 | End: 2018-02-17

## 2018-01-19 RX ORDER — PANTOPRAZOLE SODIUM 20 MG/1
1 TABLET, DELAYED RELEASE ORAL
Qty: 30 | Refills: 0 | OUTPATIENT
Start: 2018-01-19 | End: 2018-02-17

## 2018-01-19 RX ADMIN — Medication 50 MICROGRAM(S): at 05:13

## 2018-01-19 RX ADMIN — PANTOPRAZOLE SODIUM 40 MILLIGRAM(S): 20 TABLET, DELAYED RELEASE ORAL at 05:13

## 2018-01-19 RX ADMIN — Medication 6 MILLIGRAM(S): at 13:21

## 2018-01-19 RX ADMIN — Medication 6 MILLIGRAM(S): at 05:14

## 2018-01-19 RX ADMIN — LIOTHYRONINE SODIUM 5 MICROGRAM(S): 25 TABLET ORAL at 05:13

## 2018-01-19 NOTE — DISCHARGE NOTE ADULT - CARE PLAN
Principal Discharge DX:	Stridor  Goal:	resolved  Assessment and plan of treatment:	Follow up with DR Ventura  Secondary Diagnosis:	Dizziness  Goal:	continue meclezine  Secondary Diagnosis:	Hypothyroidism (acquired)  Secondary Diagnosis:	Prophylactic measure  Secondary Diagnosis:	Adjustment disorder, unspecified type

## 2018-01-19 NOTE — PROGRESS NOTE ADULT - PROBLEM SELECTOR PLAN 2
symptoms consistent with vertigo, improving with meclizine  reports extensive outpatient neuro workup with negative CT and MRI of brain within past 6 months which was negative  orthostatic vitals negative x 2  c/w PO meclizine 25mg q8 prn  outpatient ENT f/u

## 2018-01-19 NOTE — DISCHARGE NOTE ADULT - HOSPITAL COURSE
This is a 42 year old woman with  hypothyroidism, psoriasis (previously on humira), recurrent sinusitis, and recent dx of autoimmune hepatitis based on liver bx (1/2018) with recent admission 1/9-1/12 for stridor now presenting with recurrent stridor x 2 days improved. ENT consulted-mild arytenoid and post cricoid edema, however, airway widely patent. medrol pack and PPI for discharge   -f/u ENT attending recs  -appreciate psych recs - no interventions. This is a 42 year old woman with  hypothyroidism, psoriasis (previously on humira), recurrent sinusitis, and recent dx of autoimmune hepatitis based on liver bx (1/2018) with recent admission 1/9-1/12 for stridor now presenting with recurrent stridor x 2 days    Hospital Course: ENT consulted, FOE showed mild arytenoid and post cricoid edema, however, airway widely patent. She was given IV dexamethasone and 1 dose of epinephrine. MICU was consulted and rec no acute intervention as patient did not have stridor was satting 100% on RA, without any distress. ENT recommended c/w IV dexamethasone and start PPI as possible silent reflux laryngitis. Patient was monitored without any signs of stridor, wheezing, respiratory distress and sat near 100% on RA. moving air well.. Per ENT she was transitioned to medrol pack and will follow up with Dr Ventura on 1/30/18. Her Dizziness is most likely due to to vertigo as it improved with meclizine, orthostatic were negative. She reports having negative CT and MR of brain within 6 months which was negative. LFT were wnl and she will follow GI as outpatient. She is HD stable for discharge and will follow with ENT and PMD    Discharge Diagnosis:   Stridor  Vertigo   Autoimmune Hepatitis  Hypothyroidism

## 2018-01-19 NOTE — DISCHARGE NOTE ADULT - MEDICATION SUMMARY - MEDICATIONS TO TAKE
I will START or STAY ON the medications listed below when I get home from the hospital:    Medrol Dosepak   -- Take medrol dosepak as directed  -- Indication: For take as instructed on packet    meclizine 25 mg oral tablet  -- 1 tab(s) by mouth every 8 hours, As needed, Dizziness  -- Indication: For Dizziness    pantoprazole 40 mg oral delayed release tablet  -- 1 tab(s) by mouth once a day (before a meal)  -- Indication: For Prophylactic measure    Synthroid 50 mcg (0.05 mg) oral tablet  --  by mouth   -- Indication: For Hypothyroidism (acquired)    Cytomel 5 mcg oral tablet  -- 1 tab(s) by mouth once a day  -- Indication: For Prophylactic measure

## 2018-01-19 NOTE — PROGRESS NOTE ADULT - PROBLEM SELECTOR PLAN 1
Resolved, unclear etiology, currently no oropharyngeal swelling or evidence of angioedema, 02 sat 100%, no sob or wheezing. Consider silent reflux vs recent URI vs unlikely psychogenic   --ENT shows mild arytenoid and post cricoid edema, however, airway widely patent.   -discussed with ENT, transition to medrol pack today and follow up with ENT Dr Tirado on Jan 30 2018.   -appreciate psych recs - no interventions

## 2018-01-19 NOTE — PROGRESS NOTE ADULT - SUBJECTIVE AND OBJECTIVE BOX
Patient is a 42y old  Female who presents with a chief complaint of stridor     SUBJECTIVE / OVERNIGHT EVENTS: No overnight events. FEels well, denies any stridor, sob, wheezing, difficulty eating or swallowing, chest pain, fever, lip, tougne swelling. Wants to go home. Still get spinning sensation but improves with meclizine, able to ambulate without any issues or falls, orthostatic vitals negative    MEDICATIONS  (STANDING):  dexamethasone  Injectable 6 milliGRAM(s) IV Push every 8 hours  enoxaparin Injectable 40 milliGRAM(s) SubCutaneous every 24 hours  levothyroxine 50 MICROGram(s) Oral daily  liothyronine 5 MICROGram(s) Oral daily  pantoprazole    Tablet 40 milliGRAM(s) Oral before breakfast    MEDICATIONS  (PRN):  meclizine 25 milliGRAM(s) Oral every 8 hours PRN Dizziness      Vital Signs Last 24 Hrs  T(C): 36.9 (19 Jan 2018 05:05), Max: 37.2 (18 Jan 2018 21:11)  T(F): 98.4 (19 Jan 2018 05:05), Max: 98.9 (18 Jan 2018 21:11)  HR: 70 (18 Jan 2018 21:11) (70 - 70)  BP: 113/74 (18 Jan 2018 21:11) (113/74 - 113/74)  BP(mean): --  RR: 18 (19 Jan 2018 05:05) (18 - 18)  SpO2: 99% (19 Jan 2018 05:05) (97% - 99%)  CAPILLARY BLOOD GLUCOSE        I&O's Summary    18 Jan 2018 07:01  -  19 Jan 2018 07:00  --------------------------------------------------------  IN: 640 mL / OUT: 0 mL / NET: 640 mL      PHYSICAL EXAM:  GENERAL: NAD, well-developed  HEAD:  Atraumatic, Normocephalic  EYES: EOMI, PERRLA, conjunctiva and sclera clear  NECK: Supple, No JVD  Mouth: no oropharyngeal tongue, or lip swelling  CHEST/LUNG: Clear to auscultation bilaterally; No wheeze  HEART: Regular rate and rhythm; No murmurs, rubs, or gallops  ABDOMEN: Soft, Nontender, Nondistended; Bowel sounds present  EXTREMITIES:  2+ Peripheral Pulses, No clubbing, cyanosis, or edema  PSYCH: AAOx3  NEUROLOGY: non-focal      LABS:                        9.6    5.98  )-----------( 215      ( 19 Jan 2018 07:34 )             30.3     01-19    140  |  104  |  6<L>  ----------------------------<  135<H>  4.1   |  23  |  0.59    Ca    8.7      19 Jan 2018 07:41    TPro  6.9  /  Alb  3.7  /  TBili  0.3  /  DBili  x   /  AST  26  /  ALT  32  /  AlkPhos  50  01-18    PT/INR - ( 17 Jan 2018 16:20 )   PT: 10.8 sec;   INR: 1.00 ratio         PTT - ( 17 Jan 2018 16:20 )  PTT:22.4 sec              RADIOLOGY & ADDITIONAL TESTS:    Imaging Personally Reviewed:    Consultant(s) Notes Reviewed:  ENT    Care Discussed with Consultants/Other Providers: ENT NP

## 2018-01-19 NOTE — DISCHARGE NOTE ADULT - CARE PROVIDER_API CALL
Celestino Ventura (MD), Otolaryngology  [address] [place] [id number]  Phone: [phone]  Fax: [phone]

## 2018-01-19 NOTE — PROGRESS NOTE ADULT - ASSESSMENT
This is a 42 year old woman with  hypothyroidism, psoriasis (previously on humira), recurrent sinusitis, and recent dx of autoimmune hepatitis based on liver bx (1/2018) with recent admission 1/9-1/12 for stridor now presenting with recurrent stridor x 2 days now improved.

## 2018-01-30 ENCOUNTER — APPOINTMENT (OUTPATIENT)
Dept: OTOLARYNGOLOGY | Facility: CLINIC | Age: 43
End: 2018-01-30
Payer: MEDICAID

## 2018-01-30 VITALS
BODY MASS INDEX: 21.9 KG/M2 | HEART RATE: 95 BPM | HEIGHT: 62 IN | WEIGHT: 119 LBS | DIASTOLIC BLOOD PRESSURE: 85 MMHG | SYSTOLIC BLOOD PRESSURE: 131 MMHG

## 2018-01-30 PROCEDURE — 99213 OFFICE O/P EST LOW 20 MIN: CPT | Mod: 25

## 2018-01-30 PROCEDURE — 31575 DIAGNOSTIC LARYNGOSCOPY: CPT

## 2018-02-14 ENCOUNTER — APPOINTMENT (OUTPATIENT)
Dept: INTERNAL MEDICINE | Facility: CLINIC | Age: 43
End: 2018-02-14
Payer: MEDICAID

## 2018-02-14 PROCEDURE — 36415 COLL VENOUS BLD VENIPUNCTURE: CPT

## 2018-02-15 LAB
ALBUMIN SERPL ELPH-MCNC: 4.1 G/DL
ALP BLD-CCNC: 70 U/L
ALT SERPL-CCNC: 25 U/L
ANION GAP SERPL CALC-SCNC: 15 MMOL/L
AST SERPL-CCNC: 37 U/L
BASOPHILS # BLD AUTO: 0.02 K/UL
BASOPHILS NFR BLD AUTO: 0.3 %
BILIRUB SERPL-MCNC: 0.6 MG/DL
BUN SERPL-MCNC: 6 MG/DL
CALCIUM SERPL-MCNC: 9.3 MG/DL
CHLORIDE SERPL-SCNC: 100 MMOL/L
CO2 SERPL-SCNC: 24 MMOL/L
CREAT SERPL-MCNC: 0.64 MG/DL
EOSINOPHIL # BLD AUTO: 0.08 K/UL
EOSINOPHIL NFR BLD AUTO: 1.2 %
GLUCOSE SERPL-MCNC: 99 MG/DL
HCT VFR BLD CALC: 33.7 %
HGB BLD-MCNC: 10.7 G/DL
IMM GRANULOCYTES NFR BLD AUTO: 0.1 %
LYMPHOCYTES # BLD AUTO: 1.57 K/UL
LYMPHOCYTES NFR BLD AUTO: 23.5 %
MAN DIFF?: NORMAL
MCHC RBC-ENTMCNC: 28.9 PG
MCHC RBC-ENTMCNC: 31.8 GM/DL
MCV RBC AUTO: 91.1 FL
MONOCYTES # BLD AUTO: 0.47 K/UL
MONOCYTES NFR BLD AUTO: 7 %
NEUTROPHILS # BLD AUTO: 4.52 K/UL
NEUTROPHILS NFR BLD AUTO: 67.9 %
PLATELET # BLD AUTO: 240 K/UL
POTASSIUM SERPL-SCNC: 4.5 MMOL/L
PROT SERPL-MCNC: 7.6 G/DL
RBC # BLD: 3.7 M/UL
RBC # FLD: 16.7 %
SMOOTH MUSCLE AB SER QL IF: ABNORMAL
SODIUM SERPL-SCNC: 139 MMOL/L
WBC # FLD AUTO: 6.67 K/UL

## 2018-02-16 LAB
ANA PAT FLD IF-IMP: ABNORMAL
ANA SER IF-ACNC: ABNORMAL
DEPRECATED KAPPA LC FREE/LAMBDA SER: 0.48 RATIO
IGA SER QL IEP: 269 MG/DL
IGG SER QL IEP: 1060 MG/DL
IGM SER QL IEP: 178 MG/DL
KAPPA LC CSF-MCNC: 1.99 MG/DL
KAPPA LC SERPL-MCNC: 0.95 MG/DL

## 2018-02-16 RX ORDER — ADALIMUMAB 40MG/0.8ML
40 KIT SUBCUTANEOUS
Qty: 2 | Refills: 0 | Status: DISCONTINUED | COMMUNITY
Start: 2017-05-26 | End: 2018-02-16

## 2018-02-23 ENCOUNTER — APPOINTMENT (OUTPATIENT)
Dept: DERMATOLOGY | Facility: CLINIC | Age: 43
End: 2018-02-23
Payer: MEDICAID

## 2018-02-23 VITALS — DIASTOLIC BLOOD PRESSURE: 80 MMHG | SYSTOLIC BLOOD PRESSURE: 124 MMHG

## 2018-02-23 LAB
TPMT ENZYME INTERPRETATION: NORMAL
TPMT ENZYME METHODOLOGY: NORMAL
TPMT ENZYME: 34.9

## 2018-02-23 PROCEDURE — 99214 OFFICE O/P EST MOD 30 MIN: CPT

## 2018-02-27 DIAGNOSIS — R79.89 OTHER SPECIFIED ABNORMAL FINDINGS OF BLOOD CHEMISTRY: ICD-10-CM

## 2018-02-28 ENCOUNTER — RX RENEWAL (OUTPATIENT)
Age: 43
End: 2018-02-28

## 2018-03-06 ENCOUNTER — APPOINTMENT (OUTPATIENT)
Age: 43
End: 2018-03-06
Payer: MEDICAID

## 2018-03-06 VITALS
DIASTOLIC BLOOD PRESSURE: 77 MMHG | SYSTOLIC BLOOD PRESSURE: 120 MMHG | HEIGHT: 62 IN | HEART RATE: 81 BPM | RESPIRATION RATE: 17 BRPM | WEIGHT: 122 LBS | TEMPERATURE: 98.8 F | BODY MASS INDEX: 22.45 KG/M2

## 2018-03-06 DIAGNOSIS — K75.4 AUTOIMMUNE HEPATITIS: ICD-10-CM

## 2018-03-06 PROCEDURE — 99214 OFFICE O/P EST MOD 30 MIN: CPT

## 2018-03-20 ENCOUNTER — APPOINTMENT (OUTPATIENT)
Dept: DERMATOLOGY | Facility: CLINIC | Age: 43
End: 2018-03-20
Payer: MEDICAID

## 2018-03-20 VITALS — SYSTOLIC BLOOD PRESSURE: 114 MMHG | DIASTOLIC BLOOD PRESSURE: 72 MMHG

## 2018-03-20 DIAGNOSIS — L40.9 PSORIASIS, UNSPECIFIED: ICD-10-CM

## 2018-03-20 DIAGNOSIS — Z79.899 OTHER LONG TERM (CURRENT) DRUG THERAPY: ICD-10-CM

## 2018-03-20 PROCEDURE — 99214 OFFICE O/P EST MOD 30 MIN: CPT | Mod: GC

## 2018-03-20 RX ORDER — ADALIMUMAB 40MG/0.8ML
40 KIT SUBCUTANEOUS
Qty: 2 | Refills: 2 | Status: ACTIVE | COMMUNITY
Start: 2017-05-26 | End: 1900-01-01

## 2018-03-30 ENCOUNTER — APPOINTMENT (OUTPATIENT)
Dept: INTERNAL MEDICINE | Facility: CLINIC | Age: 43
End: 2018-03-30
Payer: MEDICAID

## 2018-03-30 VITALS — SYSTOLIC BLOOD PRESSURE: 110 MMHG | DIASTOLIC BLOOD PRESSURE: 80 MMHG | HEART RATE: 78 BPM

## 2018-03-30 DIAGNOSIS — E03.9 HYPOTHYROIDISM, UNSPECIFIED: ICD-10-CM

## 2018-03-30 DIAGNOSIS — R06.1 STRIDOR: ICD-10-CM

## 2018-03-30 DIAGNOSIS — Z87.09 PERSONAL HISTORY OF OTHER DISEASES OF THE RESPIRATORY SYSTEM: ICD-10-CM

## 2018-03-30 DIAGNOSIS — R25.1 TREMOR, UNSPECIFIED: ICD-10-CM

## 2018-03-30 PROCEDURE — 36415 COLL VENOUS BLD VENIPUNCTURE: CPT

## 2018-03-30 PROCEDURE — 99214 OFFICE O/P EST MOD 30 MIN: CPT | Mod: 25

## 2018-03-31 ENCOUNTER — OTHER (OUTPATIENT)
Age: 43
End: 2018-03-31

## 2018-04-02 ENCOUNTER — RESULT REVIEW (OUTPATIENT)
Age: 43
End: 2018-04-02

## 2018-04-02 LAB
ALBUMIN SERPL ELPH-MCNC: 5 G/DL
ALP BLD-CCNC: 48 U/L
ALT SERPL-CCNC: 34 U/L
ANION GAP SERPL CALC-SCNC: 18 MMOL/L
AST SERPL-CCNC: 62 U/L
BILIRUB SERPL-MCNC: 0.2 MG/DL
BUN SERPL-MCNC: 8 MG/DL
CALCIUM SERPL-MCNC: 9.8 MG/DL
CHLORIDE SERPL-SCNC: 97 MMOL/L
CO2 SERPL-SCNC: 23 MMOL/L
CREAT SERPL-MCNC: 0.4 MG/DL
DEPRECATED KAPPA LC FREE/LAMBDA SER: 0.63 RATIO
FERRITIN SERPL-MCNC: 67 NG/ML
GLUCOSE SERPL-MCNC: 87 MG/DL
IGA SER QL IEP: 267 MG/DL
IGG SER QL IEP: 1140 MG/DL
IGM SER QL IEP: 182 MG/DL
KAPPA LC CSF-MCNC: 1.72 MG/DL
KAPPA LC SERPL-MCNC: 1.08 MG/DL
POTASSIUM SERPL-SCNC: 4.7 MMOL/L
PROT SERPL-MCNC: 8.5 G/DL
SODIUM SERPL-SCNC: 138 MMOL/L
TSH SERPL-ACNC: 1.73 UIU/ML

## 2018-04-04 ENCOUNTER — RESULT REVIEW (OUTPATIENT)
Age: 43
End: 2018-04-04

## 2018-04-04 LAB
BASOPHILS # BLD AUTO: 0.02 K/UL
BASOPHILS NFR BLD AUTO: 0.4 %
EOSINOPHIL # BLD AUTO: 0.1 K/UL
EOSINOPHIL NFR BLD AUTO: 2.2 %
FOLATE SERPL-MCNC: 7 NG/ML
HCT VFR BLD CALC: 35.3 %
HGB BLD-MCNC: 10.8 G/DL
IMM GRANULOCYTES NFR BLD AUTO: 0.2 %
IRON SATN MFR SERPL: 9 %
IRON SERPL-MCNC: 40 UG/DL
LYMPHOCYTES # BLD AUTO: 1.7 K/UL
LYMPHOCYTES NFR BLD AUTO: 37.8 %
MAN DIFF?: NORMAL
MCHC RBC-ENTMCNC: 28.3 PG
MCHC RBC-ENTMCNC: 30.6 GM/DL
MCV RBC AUTO: 92.7 FL
MONOCYTES # BLD AUTO: 0.53 K/UL
MONOCYTES NFR BLD AUTO: 11.8 %
NEUTROPHILS # BLD AUTO: 2.14 K/UL
NEUTROPHILS NFR BLD AUTO: 47.6 %
PLATELET # BLD AUTO: 218 K/UL
RBC # BLD: 3.81 M/UL
RBC # FLD: 15.5 %
TIBC SERPL-MCNC: 425 UG/DL
UIBC SERPL-MCNC: 385 UG/DL
VIT B12 SERPL-MCNC: 676 PG/ML
WBC # FLD AUTO: 4.5 K/UL

## 2018-04-04 RX ORDER — SERTRALINE 25 MG/1
25 TABLET, FILM COATED ORAL
Qty: 50 | Refills: 0 | Status: DISCONTINUED | COMMUNITY
Start: 2018-03-30 | End: 2018-04-04

## 2018-04-10 ENCOUNTER — APPOINTMENT (OUTPATIENT)
Dept: INTERNAL MEDICINE | Facility: CLINIC | Age: 43
End: 2018-04-10

## 2018-04-12 ENCOUNTER — TRANSCRIPTION ENCOUNTER (OUTPATIENT)
Age: 43
End: 2018-04-12

## 2018-04-13 ENCOUNTER — APPOINTMENT (OUTPATIENT)
Dept: INTERNAL MEDICINE | Facility: CLINIC | Age: 43
End: 2018-04-13
Payer: MEDICAID

## 2018-04-13 VITALS
BODY MASS INDEX: 21.9 KG/M2 | DIASTOLIC BLOOD PRESSURE: 80 MMHG | HEIGHT: 62 IN | WEIGHT: 119 LBS | SYSTOLIC BLOOD PRESSURE: 120 MMHG | TEMPERATURE: 98.5 F

## 2018-04-13 PROCEDURE — 99213 OFFICE O/P EST LOW 20 MIN: CPT

## 2018-04-17 ENCOUNTER — APPOINTMENT (OUTPATIENT)
Dept: INTERNAL MEDICINE | Facility: CLINIC | Age: 43
End: 2018-04-17
Payer: MEDICAID

## 2018-04-17 VITALS — SYSTOLIC BLOOD PRESSURE: 120 MMHG | DIASTOLIC BLOOD PRESSURE: 80 MMHG

## 2018-04-17 DIAGNOSIS — D64.9 ANEMIA, UNSPECIFIED: ICD-10-CM

## 2018-04-17 DIAGNOSIS — J38.3 OTHER DISEASES OF VOCAL CORDS: ICD-10-CM

## 2018-04-17 PROCEDURE — 99214 OFFICE O/P EST MOD 30 MIN: CPT

## 2018-04-18 RX ORDER — AMITRIPTYLINE HYDROCHLORIDE 25 MG/1
25 TABLET, FILM COATED ORAL AT BEDTIME
Qty: 30 | Refills: 0 | Status: DISCONTINUED | COMMUNITY
Start: 2018-04-17 | End: 2018-04-18

## 2018-04-19 ENCOUNTER — TRANSCRIPTION ENCOUNTER (OUTPATIENT)
Age: 43
End: 2018-04-19

## 2018-04-20 ENCOUNTER — APPOINTMENT (OUTPATIENT)
Dept: INTERNAL MEDICINE | Facility: CLINIC | Age: 43
End: 2018-04-20
Payer: MEDICAID

## 2018-04-20 VITALS — DIASTOLIC BLOOD PRESSURE: 80 MMHG | SYSTOLIC BLOOD PRESSURE: 120 MMHG

## 2018-04-20 PROCEDURE — 99214 OFFICE O/P EST MOD 30 MIN: CPT

## 2018-04-24 ENCOUNTER — LABORATORY RESULT (OUTPATIENT)
Age: 43
End: 2018-04-24

## 2018-04-24 ENCOUNTER — APPOINTMENT (OUTPATIENT)
Dept: INTERNAL MEDICINE | Facility: CLINIC | Age: 43
End: 2018-04-24
Payer: MEDICAID

## 2018-04-24 ENCOUNTER — NON-APPOINTMENT (OUTPATIENT)
Age: 43
End: 2018-04-24

## 2018-04-24 VITALS — TEMPERATURE: 98.4 F | SYSTOLIC BLOOD PRESSURE: 110 MMHG | DIASTOLIC BLOOD PRESSURE: 70 MMHG

## 2018-04-24 DIAGNOSIS — T88.7XXA UNSPECIFIED ADVERSE EFFECT OF DRUG OR MEDICAMENT, INITIAL ENCOUNTER: ICD-10-CM

## 2018-04-24 PROCEDURE — 99215 OFFICE O/P EST HI 40 MIN: CPT | Mod: 25

## 2018-04-24 PROCEDURE — 93000 ELECTROCARDIOGRAM COMPLETE: CPT

## 2018-04-24 PROCEDURE — 36415 COLL VENOUS BLD VENIPUNCTURE: CPT

## 2018-04-24 RX ORDER — AMITRIPTYLINE HYDROCHLORIDE 10 MG/1
10 TABLET, FILM COATED ORAL
Qty: 1 | Refills: 0 | Status: DISCONTINUED | COMMUNITY
Start: 2018-04-18 | End: 2018-04-24

## 2018-04-25 ENCOUNTER — RESULT REVIEW (OUTPATIENT)
Age: 43
End: 2018-04-25

## 2018-04-25 ENCOUNTER — FORM ENCOUNTER (OUTPATIENT)
Age: 43
End: 2018-04-25

## 2018-04-25 ENCOUNTER — LABORATORY RESULT (OUTPATIENT)
Age: 43
End: 2018-04-25

## 2018-04-25 LAB
ALBUMIN SERPL ELPH-MCNC: 4.8 G/DL
ALP BLD-CCNC: 73 U/L
ALT SERPL-CCNC: 168 U/L
ANION GAP SERPL CALC-SCNC: 15 MMOL/L
APPEARANCE: CLEAR
AST SERPL-CCNC: 284 U/L
BASOPHILS # BLD AUTO: 0.04 K/UL
BASOPHILS NFR BLD AUTO: 0.9 %
BILIRUB SERPL-MCNC: 0.2 MG/DL
BILIRUBIN URINE: NEGATIVE
BLOOD URINE: NEGATIVE
BUN SERPL-MCNC: 8 MG/DL
CALCIUM SERPL-MCNC: 9.6 MG/DL
CHLORIDE SERPL-SCNC: 101 MMOL/L
CK SERPL-CCNC: 210 U/L
CO2 SERPL-SCNC: 26 MMOL/L
COLOR: YELLOW
CREAT SERPL-MCNC: 0.72 MG/DL
EOSINOPHIL # BLD AUTO: 0.04 K/UL
EOSINOPHIL NFR BLD AUTO: 0.9 %
GLUCOSE QUALITATIVE U: NEGATIVE MG/DL
GLUCOSE SERPL-MCNC: 85 MG/DL
HCG SERPL QL: NEGATIVE
HCT VFR BLD CALC: 40 %
HGB BLD-MCNC: 11.9 G/DL
IMM GRANULOCYTES NFR BLD AUTO: 0.2 %
KETONES URINE: NEGATIVE
LEUKOCYTE ESTERASE URINE: NEGATIVE
LYMPHOCYTES # BLD AUTO: 1.98 K/UL
LYMPHOCYTES NFR BLD AUTO: 42.9 %
MAGNESIUM SERPL-MCNC: 1.9 MG/DL
MAN DIFF?: NORMAL
MCHC RBC-ENTMCNC: 28.7 PG
MCHC RBC-ENTMCNC: 29.8 GM/DL
MCV RBC AUTO: 96.4 FL
MONOCYTES # BLD AUTO: 0.55 K/UL
MONOCYTES NFR BLD AUTO: 11.9 %
NEUTROPHILS # BLD AUTO: 2 K/UL
NEUTROPHILS NFR BLD AUTO: 43.2 %
NITRITE URINE: NEGATIVE
PAPP-A SERPL-ACNC: <1 MIU/ML
PH URINE: 5.5
PLATELET # BLD AUTO: 212 K/UL
POTASSIUM SERPL-SCNC: 4.6 MMOL/L
PROT SERPL-MCNC: 8.7 G/DL
PROTEIN URINE: 100 MG/DL
RBC # BLD: 4.15 M/UL
RBC # FLD: 15.9 %
SODIUM SERPL-SCNC: 142 MMOL/L
SPECIFIC GRAVITY URINE: 1.03
TSH SERPL-ACNC: 0.84 UIU/ML
UROBILINOGEN URINE: NEGATIVE MG/DL
WBC # FLD AUTO: 4.62 K/UL

## 2018-04-26 ENCOUNTER — OUTPATIENT (OUTPATIENT)
Dept: OUTPATIENT SERVICES | Facility: HOSPITAL | Age: 43
LOS: 1 days | End: 2018-04-26
Payer: MEDICAID

## 2018-04-26 ENCOUNTER — APPOINTMENT (OUTPATIENT)
Dept: MRI IMAGING | Facility: CLINIC | Age: 43
End: 2018-04-26
Payer: MEDICAID

## 2018-04-26 DIAGNOSIS — Z98.890 OTHER SPECIFIED POSTPROCEDURAL STATES: Chronic | ICD-10-CM

## 2018-04-26 DIAGNOSIS — Z98.89 OTHER SPECIFIED POSTPROCEDURAL STATES: Chronic | ICD-10-CM

## 2018-04-26 DIAGNOSIS — E34.8 OTHER SPECIFIED ENDOCRINE DISORDERS: ICD-10-CM

## 2018-04-26 PROCEDURE — 70553 MRI BRAIN STEM W/O & W/DYE: CPT

## 2018-04-26 PROCEDURE — A9585: CPT

## 2018-04-26 PROCEDURE — 70553 MRI BRAIN STEM W/O & W/DYE: CPT | Mod: 26

## 2018-04-30 ENCOUNTER — OUTPATIENT (OUTPATIENT)
Dept: OUTPATIENT SERVICES | Facility: HOSPITAL | Age: 43
LOS: 1 days | Discharge: STILL ADMITTED OR ADMINISTRATIVE | End: 2018-04-30
Payer: MEDICAID

## 2018-04-30 DIAGNOSIS — Z98.890 OTHER SPECIFIED POSTPROCEDURAL STATES: Chronic | ICD-10-CM

## 2018-04-30 DIAGNOSIS — Z78.9 OTHER SPECIFIED HEALTH STATUS: ICD-10-CM

## 2018-04-30 DIAGNOSIS — Z98.89 OTHER SPECIFIED POSTPROCEDURAL STATES: Chronic | ICD-10-CM

## 2018-04-30 PROCEDURE — 90792 PSYCH DIAG EVAL W/MED SRVCS: CPT

## 2018-05-01 ENCOUNTER — APPOINTMENT (OUTPATIENT)
Dept: INTERNAL MEDICINE | Facility: CLINIC | Age: 43
End: 2018-05-01

## 2018-05-01 ENCOUNTER — APPOINTMENT (OUTPATIENT)
Dept: HEPATOLOGY | Facility: CLINIC | Age: 43
End: 2018-05-01

## 2018-05-01 DIAGNOSIS — F39 UNSPECIFIED MOOD [AFFECTIVE] DISORDER: ICD-10-CM

## 2018-05-01 DIAGNOSIS — E03.9 HYPOTHYROIDISM, UNSPECIFIED: ICD-10-CM

## 2018-05-01 DIAGNOSIS — L40.9 PSORIASIS, UNSPECIFIED: ICD-10-CM

## 2018-05-01 DIAGNOSIS — K75.4 AUTOIMMUNE HEPATITIS: ICD-10-CM

## 2018-05-01 DIAGNOSIS — F10.20 ALCOHOL DEPENDENCE, UNCOMPLICATED: ICD-10-CM

## 2018-05-16 ENCOUNTER — APPOINTMENT (OUTPATIENT)
Dept: NEUROLOGY | Facility: CLINIC | Age: 43
End: 2018-05-16
Payer: MEDICAID

## 2018-05-16 VITALS
BODY MASS INDEX: 21.9 KG/M2 | WEIGHT: 119 LBS | HEIGHT: 62 IN | HEART RATE: 85 BPM | DIASTOLIC BLOOD PRESSURE: 82 MMHG | SYSTOLIC BLOOD PRESSURE: 123 MMHG

## 2018-05-16 DIAGNOSIS — R56.9 UNSPECIFIED CONVULSIONS: ICD-10-CM

## 2018-05-16 PROCEDURE — 99215 OFFICE O/P EST HI 40 MIN: CPT

## 2018-05-16 RX ORDER — PROPRANOLOL HYDROCHLORIDE 20 MG/1
20 TABLET ORAL DAILY
Qty: 30 | Refills: 1 | Status: DISCONTINUED | COMMUNITY
Start: 2018-03-30 | End: 2018-05-16

## 2018-05-18 ENCOUNTER — APPOINTMENT (OUTPATIENT)
Dept: INTERNAL MEDICINE | Facility: CLINIC | Age: 43
End: 2018-05-18
Payer: MEDICAID

## 2018-05-18 PROCEDURE — 36415 COLL VENOUS BLD VENIPUNCTURE: CPT

## 2018-05-21 ENCOUNTER — RX RENEWAL (OUTPATIENT)
Age: 43
End: 2018-05-21

## 2018-05-21 LAB — CERULOPLASMIN SERPL-MCNC: 36 MG/DL

## 2018-05-21 RX ORDER — LEVOTHYROXINE SODIUM 0.05 MG/1
50 TABLET ORAL DAILY
Qty: 90 | Refills: 1 | Status: ACTIVE | COMMUNITY
Start: 1900-01-01 | End: 1900-01-01

## 2018-05-22 ENCOUNTER — APPOINTMENT (OUTPATIENT)
Dept: NEUROLOGY | Facility: CLINIC | Age: 43
End: 2018-05-22
Payer: MEDICAID

## 2018-05-22 PROCEDURE — 95819 EEG AWAKE AND ASLEEP: CPT

## 2018-05-23 PROCEDURE — 71045 X-RAY EXAM CHEST 1 VIEW: CPT

## 2018-05-23 PROCEDURE — 87798 DETECT AGENT NOS DNA AMP: CPT

## 2018-05-23 PROCEDURE — 96374 THER/PROPH/DIAG INJ IV PUSH: CPT

## 2018-05-23 PROCEDURE — 82435 ASSAY OF BLOOD CHLORIDE: CPT

## 2018-05-23 PROCEDURE — 87486 CHLMYD PNEUM DNA AMP PROBE: CPT

## 2018-05-23 PROCEDURE — 99291 CRITICAL CARE FIRST HOUR: CPT | Mod: 25

## 2018-05-23 PROCEDURE — 80048 BASIC METABOLIC PNL TOTAL CA: CPT

## 2018-05-23 PROCEDURE — 87581 M.PNEUMON DNA AMP PROBE: CPT

## 2018-05-23 PROCEDURE — 86900 BLOOD TYPING SEROLOGIC ABO: CPT

## 2018-05-23 PROCEDURE — 82803 BLOOD GASES ANY COMBINATION: CPT

## 2018-05-23 PROCEDURE — 82947 ASSAY GLUCOSE BLOOD QUANT: CPT

## 2018-05-23 PROCEDURE — 82962 GLUCOSE BLOOD TEST: CPT

## 2018-05-23 PROCEDURE — 85014 HEMATOCRIT: CPT

## 2018-05-23 PROCEDURE — 86901 BLOOD TYPING SEROLOGIC RH(D): CPT

## 2018-05-23 PROCEDURE — 84443 ASSAY THYROID STIM HORMONE: CPT

## 2018-05-23 PROCEDURE — 80053 COMPREHEN METABOLIC PANEL: CPT

## 2018-05-23 PROCEDURE — 85730 THROMBOPLASTIN TIME PARTIAL: CPT

## 2018-05-23 PROCEDURE — 85610 PROTHROMBIN TIME: CPT

## 2018-05-23 PROCEDURE — 86161 COMPLEMENT/FUNCTION ACTIVITY: CPT

## 2018-05-23 PROCEDURE — 96372 THER/PROPH/DIAG INJ SC/IM: CPT | Mod: XU

## 2018-05-23 PROCEDURE — 70360 X-RAY EXAM OF NECK: CPT

## 2018-05-23 PROCEDURE — 87633 RESP VIRUS 12-25 TARGETS: CPT

## 2018-05-23 PROCEDURE — 85027 COMPLETE CBC AUTOMATED: CPT

## 2018-05-23 PROCEDURE — 83605 ASSAY OF LACTIC ACID: CPT

## 2018-05-23 PROCEDURE — 84295 ASSAY OF SERUM SODIUM: CPT

## 2018-05-23 PROCEDURE — 86850 RBC ANTIBODY SCREEN: CPT

## 2018-05-23 PROCEDURE — 84480 ASSAY TRIIODOTHYRONINE (T3): CPT

## 2018-05-23 PROCEDURE — 84436 ASSAY OF TOTAL THYROXINE: CPT

## 2018-05-23 PROCEDURE — 86160 COMPLEMENT ANTIGEN: CPT

## 2018-05-23 PROCEDURE — 84132 ASSAY OF SERUM POTASSIUM: CPT

## 2018-05-23 PROCEDURE — 82330 ASSAY OF CALCIUM: CPT

## 2018-06-04 ENCOUNTER — APPOINTMENT (OUTPATIENT)
Dept: NEUROLOGY | Facility: CLINIC | Age: 43
End: 2018-06-04
Payer: MEDICAID

## 2018-06-04 VITALS
SYSTOLIC BLOOD PRESSURE: 131 MMHG | BODY MASS INDEX: 21.9 KG/M2 | WEIGHT: 119 LBS | HEART RATE: 71 BPM | DIASTOLIC BLOOD PRESSURE: 89 MMHG | HEIGHT: 62 IN

## 2018-06-04 PROCEDURE — 99214 OFFICE O/P EST MOD 30 MIN: CPT

## 2018-06-04 RX ORDER — PROPRANOLOL HYDROCHLORIDE 60 MG/1
60 CAPSULE, EXTENDED RELEASE ORAL
Qty: 30 | Refills: 5 | Status: ACTIVE | COMMUNITY
Start: 2018-05-16 | End: 1900-01-01

## 2018-06-12 ENCOUNTER — APPOINTMENT (OUTPATIENT)
Dept: HEPATOLOGY | Facility: CLINIC | Age: 43
End: 2018-06-12
Payer: MEDICAID

## 2018-06-12 VITALS
TEMPERATURE: 98.1 F | WEIGHT: 119 LBS | SYSTOLIC BLOOD PRESSURE: 109 MMHG | RESPIRATION RATE: 17 BRPM | HEIGHT: 62 IN | DIASTOLIC BLOOD PRESSURE: 66 MMHG | BODY MASS INDEX: 21.9 KG/M2 | OXYGEN SATURATION: 100 % | HEART RATE: 48 BPM

## 2018-06-12 PROCEDURE — 99213 OFFICE O/P EST LOW 20 MIN: CPT

## 2018-06-14 LAB
ALBUMIN SERPL ELPH-MCNC: 4.4 G/DL
ALP BLD-CCNC: 114 U/L
ALT SERPL-CCNC: 167 U/L
AST SERPL-CCNC: 258 U/L
BILIRUB DIRECT SERPL-MCNC: 0.1 MG/DL
BILIRUB INDIRECT SERPL-MCNC: 0.3 MG/DL
BILIRUB SERPL-MCNC: 0.4 MG/DL
PROT SERPL-MCNC: 8.8 G/DL

## 2018-06-15 LAB
DEPRECATED KAPPA LC FREE/LAMBDA SER: 0.86 RATIO
IGA SER QL IEP: 292 MG/DL
IGG SER QL IEP: 2143 MG/DL
IGM SER QL IEP: 221 MG/DL
KAPPA LC CSF-MCNC: 2.42 MG/DL
KAPPA LC SERPL-MCNC: 2.09 MG/DL

## 2018-06-18 ENCOUNTER — APPOINTMENT (OUTPATIENT)
Dept: INTERNAL MEDICINE | Facility: CLINIC | Age: 43
End: 2018-06-18
Payer: MEDICAID

## 2018-06-18 VITALS
BODY MASS INDEX: 22.08 KG/M2 | DIASTOLIC BLOOD PRESSURE: 80 MMHG | SYSTOLIC BLOOD PRESSURE: 110 MMHG | HEIGHT: 62 IN | WEIGHT: 120 LBS

## 2018-06-18 PROCEDURE — 99214 OFFICE O/P EST MOD 30 MIN: CPT

## 2018-06-18 RX ORDER — QUETIAPINE 25 MG/1
25 TABLET, FILM COATED ORAL
Refills: 0 | Status: DISCONTINUED | COMMUNITY
End: 2018-06-18

## 2018-06-18 NOTE — PHYSICAL EXAM
[No Acute Distress] : no acute distress [Well Nourished] : well nourished [Clear to Auscultation] : lungs were clear to auscultation bilaterally [No Accessory Muscle Use] : no accessory muscle use [Regular Rhythm] : with a regular rhythm [Normal S1, S2] : normal S1 and S2 [No Murmur] : no murmur heard [Soft] : abdomen soft [Non Tender] : non-tender [Non-distended] : non-distended [No Masses] : no abdominal mass palpated [No HSM] : no HSM [Normal Bowel Sounds] : normal bowel sounds [Normal Affect] : the affect was normal [Normal Insight/Judgement] : insight and judgment were intact

## 2018-06-18 NOTE — HISTORY OF PRESENT ILLNESS
[de-identified] : had been doing wel on seroquel 25mg daily in AM and seroquel 50mg qhs - had been doing well on it but ran out 1 month ago \par no si or hi \par +anxious \par \par starting new job in [place] \par \par

## 2018-06-18 NOTE — ASSESSMENT
[FreeTextEntry1] : f/u in 2 weeks\par \par it appears that her LFTs have become elevated again back to her "baseline" from 2016 - of note she did have prednisone while hospitalized for ENT issues and that probably resulted in normalization of her LFT\par \par she will establish care with an internist in [place] as well as psychiatrist and gastroenterologist\par \par she will see me in July before she leaves to [place]\par

## 2018-07-09 ENCOUNTER — APPOINTMENT (OUTPATIENT)
Dept: INTERNAL MEDICINE | Facility: CLINIC | Age: 43
End: 2018-07-09
Payer: MEDICAID

## 2018-07-09 VITALS
SYSTOLIC BLOOD PRESSURE: 110 MMHG | BODY MASS INDEX: 22.45 KG/M2 | WEIGHT: 122 LBS | DIASTOLIC BLOOD PRESSURE: 80 MMHG | HEIGHT: 62 IN

## 2018-07-09 DIAGNOSIS — F32.9 ANXIETY DISORDER, UNSPECIFIED: ICD-10-CM

## 2018-07-09 DIAGNOSIS — R74.8 ABNORMAL LEVELS OF OTHER SERUM ENZYMES: ICD-10-CM

## 2018-07-09 DIAGNOSIS — F41.9 ANXIETY DISORDER, UNSPECIFIED: ICD-10-CM

## 2018-07-09 PROCEDURE — 90471 IMMUNIZATION ADMIN: CPT

## 2018-07-09 PROCEDURE — 90636 HEP A/HEP B VACC ADULT IM: CPT

## 2018-07-09 PROCEDURE — 99213 OFFICE O/P EST LOW 20 MIN: CPT | Mod: 25

## 2018-07-09 RX ORDER — LIOTHYRONINE SODIUM 5 UG/1
5 TABLET ORAL
Qty: 90 | Refills: 0 | Status: ACTIVE | COMMUNITY
Start: 2018-03-30 | End: 1900-01-01

## 2018-07-27 PROBLEM — H90.5 SENSORINEURAL HEARING LOSS: Status: ACTIVE | Noted: 2017-01-19

## 2018-08-01 ENCOUNTER — APPOINTMENT (OUTPATIENT)
Dept: INTERNAL MEDICINE | Facility: CLINIC | Age: 43
End: 2018-08-01

## 2018-08-10 RX ORDER — QUETIAPINE FUMARATE 25 MG/1
25 TABLET ORAL
Qty: 90 | Refills: 0 | Status: ACTIVE | COMMUNITY
Start: 2018-06-18 | End: 1900-01-01

## 2019-03-07 NOTE — H&P ADULT - PROBLEM SELECTOR PLAN 5
[Midline] : trachea located in midline position [Laryngoscopy Performed] : laryngoscopy was performed, see procedure section for findings [Normal] : no rashes --regular diet

## 2019-04-27 ENCOUNTER — TRANSCRIPTION ENCOUNTER (OUTPATIENT)
Age: 44
End: 2019-04-27

## 2019-12-03 NOTE — PROGRESS NOTE ADULT - NSHPATTENDINGPLANDISCUSS_GEN_ALL_CORE
Discharge instructions given and explained to patient and family with verbalization of understanding all instructions. Prescription given and explained next time and doses of each medication. Patients v/s stable, denies n/v and tolerating po, rates pain level tolerable, IV removed, and family at bedside for patient discharge home.    ENT

## 2020-05-19 NOTE — ED PROVIDER NOTE - PR
[facility] Janine-Comprehensive Medication Review for Evette's review. (Can wait until she is back in clinic next week)   154

## 2020-08-18 NOTE — DISCHARGE NOTE ADULT - PHYSICIAN SECTION COMPLETE
A1C 6.9   Low dose SSI, accucheck AC&HS , Diabetic diet   Her blood sugars are trending upward and she may require Januvia     Yes

## 2021-02-04 NOTE — ED PROVIDER NOTE - CONDUCTED A DETAILED DISCUSSION WITH PATIENT AND/OR GUARDIAN REGARDING, MDM
Ears: no ear pain and no hearing problems. Nose: no nasal congestion and no nasal drainage. Mouth/Throat: no dysphagia, no hoarseness and no throat pain. Neck: no lumps, no pain, no stiffness and no swollen glands. lab results/need to admit/radiology results

## 2022-06-28 NOTE — ED PROVIDER NOTE - CONSULTATIONS
[facility]    PROGRESS NOTE    Patient:  Paulina Yan    :  1959    Date of Service: 2022    The encounter diagnosis was Recurrent major depressive disorder, in partial remission (CMS/HCC).    Data: Pt presents as euthymic with congruent affect. Pt discussed current irritability related to her insurance, and home health agency. Pt discussed frustration navigating these situations.     Intervention: solution focused brief therapy     Patient continues to be involved in service planning:  YES    Describe above interventions:   This writer provided a safe and welcoming space for processing of events. This writer normalized the patients current sxs. This writer and patient processed patients thoughts and feelings. Clinician provided psycho education on anger, helping identify triggers, body's reaction when upset, and discussing coping skills that could be utilized. Breathing exercises practiced during session.    Patient's response to interventions: PT presents as engaged in the session and motivated for tx.    Continue to support patient's efforts and progress towards established treatment plan goals in the following ways:  assist with goal formation     Off-site:  No     This visit is being performed virtually via Telephonic Visit. Consent to treat includes permission to submit charges to the patient's insurance. It was shared that without being seen and evaluated in person, there is a risk that the information and/or assessment may be incomplete or inaccurate. This telephonic visit may be discontinued by patient or clinician, if it is felt that the telephonic connections are not adequate for her situation.   Clinical Location: [facility]  Paulina Varner's location Home and is physically present in   the [facility] at the time of this visit.       60 minutes were spent on this encounter   
additional...

## 2024-01-18 NOTE — BEHAVIORAL HEALTH ASSESSMENT NOTE - AFFECT CONGRUENCE
[facility] MED[facility] CTR RADIATION ONCOLOGY  [address] [address]  [id number]  [facility] [address]  Dept Phone: [phone]    Radiation Oncology Clinical Treatment Plan Note    Patient Name:  Thuy Harvey  YOB: 1964  MRN:  [id number]  Account Number:  [id number]  Referring Physician:  No ref. provider found  Dictating Physician:  George Brewer MD    Diagnosis:  Malignant neoplasm of female breast (CMD) C50.919, Cancer of left female breast (CMD) C50.912    Cancer Staging   Malignant neoplasm of female breast (CMD)  Staging form: Breast, AJCC 8th Edition  - Pathologic stage from 1/10/2024: No stage assigned - Signed by George Brewer MD on 1/10/2024      Summary:  Thuy has been offered radiation therapy.  Clinical treatment plan was done for the patient for the above diagnosis. Plan section describes the radiation treatment plan and prescription. Orders section lists simulation orders and treatment device as well as treatment planning related orders.    Plan - Intent:   curative - adjuvant    We are planning to deliver 3DCRT.  The planned prescription is listed below.  These elements change at the time of treatment planning or may be altered during the course of therapy based on patient tolerance and disease response.      Radiation Therapy Planning  Treatment Site 1  Treatment intent  Curative   Line of treatment  Adjuvant   Technique  3D CRT   Prescribed fraction dose  180 cGy    Prescribed total dose  4500 cGy    Patient position  Supine, head first         Orders:  In order to implement the above plan, the following elements will be required and are therefore requested. Simulation orders are included as well as simulation devices. Treatment plan orders are included for physics staff for treatment planning. Imaging orders are included for setting up treatment fields and verifying the accuracy during the treatment as well.  These may change as needed before or during the  treatment.        1/10/2024     9:12 AM   Radiation Orders   2.5MM SLICE THICKNESS Yes   BREAST BOARD - SUPINE Yes   BREAST: SCAN C1 - L2 Yes   COMPLEX TX DEVICE Yes   COMPUTER PLAN LEVEL - 3D Yes   CONTINUING MEDICAL PHYSICS Yes   DOSIMETRY CALC (QTY) Yes   HEAD FIRST Yes   PORT FILM - DAILY SINGLE DIBH Yes   RADIATION THERAPY Yes   RESPIRATORY MOTION MANAGEMENT DIBH Yes   SIMULATION COMPLEX Yes   SUPINE Yes   VERIFICATION SIMULATION Yes   WIRE BORDERS OF FIELD Yes        Evaluation:  As the patient undergoes therapy, this patient will be evaluated at least weekly to assure tolerance to the therapy and so that any of the above elements can be changed as needed. Imaging studies will be done at least weekly using on-board imaging technology as ordered and compared to the reference images for accurate setup of the patient.     Additional Information:  Setup instruction, immobilization device information, contrast information and other simulation details are given below.         Congruent

## 2024-04-10 NOTE — ED PROVIDER NOTE - CRITICAL CARE PROVIDED
Render Post-Care Instructions In Note?: yes Render Note In Bullet Format When Appropriate: No Duration Of Freeze Thaw-Cycle (Seconds): 12 Post-Care Instructions: 1. A blister may result in the majority of cases.  The blister may be filled with clear fluid or blood.  This is a normal response to therapy.\\n2. If the blister causes pain because of significant tension within the blister, you may puncture the blister with a sterile needle.  Do not unroof the blister.\\n3. If any significant discomfort arises, cold compresses may be applied.  Tylenol will help in most circumstances.\\n4. If the blister is broken by you or breaks on its own, soak the area in warm tap water twice a day, then apply Bacitracin Ointment or Vaseline.\\n5. Should there be marked swelling and pain and redness a the site of treatment, feel free to call our office.\\n6.  If the area still persists in 6 weeks, please phone for a follow-up appointment. Detail Level: Detailed Number Of Freeze-Thaw Cycles: 2 freeze-thaw cycles Consent: The patient's consent was obtained including but not limited to risks of crusting, scabbing, blistering, scarring, darker or lighter pigmentary change, recurrence, incomplete removal and infection. consult w/ pt's family directly relating to pts condition/additional history taking/direct patient care (not related to procedure)/interpretation of diagnostic studies/consultation with other physicians/documentation

## 2024-08-12 NOTE — ED PROVIDER NOTE - CROS ED ROS STATEMENT
Detail Level: Detailed Depth Of Biopsy: dermis Was A Bandage Applied: Yes Size Of Lesion In Cm: 0.8 Biopsy Type: H and E Biopsy Method: 15 blade Anesthesia Type: 0.05% lidocaine without epinephrine Anesthesia Volume In Cc: 0.5 Additional Anesthesia Volume In Cc (Will Not Render If 0): 0 Hemostasis: Drysol Wound Care: Vaseline Dressing: bandage all other ROS negative except as per HPI Destruction After The Procedure: No Type Of Destruction Used: Curettage Curettage Text: The wound bed was treated with curettage after the biopsy was performed. Cryotherapy Text: The wound bed was treated with cryotherapy after the biopsy was performed. Electrodesiccation Text: The wound bed was treated with electrodesiccation after the biopsy was performed. Electrodesiccation And Curettage Text: The wound bed was treated with electrodesiccation and curettage after the biopsy was performed. Silver Nitrate Text: The wound bed was treated with silver nitrate after the biopsy was performed. Lab: [id number] Path Notes (To The Dermatopathologist): DIONNE Consent: Verbal consent was obtained and risks were reviewed including but not limited to scarring, infection, bleeding, scabbing, incomplete removal, nerve damage and allergy to anesthesia. Post-Care Instructions: I reviewed with the patient in detail post-care instructions. Patient is to keep the biopsy site dry overnight, and then apply bacitracin twice daily until healed. Patient may apply hydrogen peroxide soaks to remove any crusting. Notification Instructions: Patient will be notified of biopsy results. However, patient instructed to call the office if not contacted within 2 weeks. Billing Type: Third-Party Bill Information: Selecting Yes will display possible errors in your note based on the variables you have selected. This validation is only offered as a suggestion for you. PLEASE NOTE THAT THE VALIDATION TEXT WILL BE REMOVED WHEN YOU FINALIZE YOUR NOTE. IF YOU WANT TO FAX A PRELIMINARY NOTE YOU WILL NEED TO TOGGLE THIS TO 'NO' IF YOU DO NOT WANT IT IN YOUR FAXED NOTE.

## 2024-09-17 NOTE — ED PROVIDER NOTE - GASTROINTESTINAL, MLM
TONIO BARFIELD    [id number]    83y      Female    CC: sob    INTERVAL HPI/OVERNIGHT EVENTS: Pt seen and examined. no acute events reported o/n. remains on HFNC     REVIEW OF SYSTEMS:    CONSTITUTIONAL: No fever, weight loss  RESPIRATORY: No wheezing, hemoptysis  CARDIOVASCULAR: No chest pain, palpitations  GASTROINTESTINAL: No abdominal or epigastric pain. No nausea, vomiting  NEUROLOGICAL: No headaches    Vital Signs Last 24 Hrs  T(C): 36.7 (17 Sep 2024 16:13), Max: 36.7 (17 Sep 2024 00:05)  T(F): 98.1 (17 Sep 2024 16:13), Max: 98.1 (17 Sep 2024 08:27)  HR: 72 (17 Sep 2024 16:22) (63 - 76)  BP: 127/52 (17 Sep 2024 16:00) (101/82 - 137/54)  BP(mean): 75 (17 Sep 2024 16:00) (67 - 90)  RR: 18 (17 Sep 2024 16:22) (16 - 26)  SpO2: 96% (17 Sep 2024 16:22) (94% - 99%)    Parameters below as of 17 Sep 2024 16:22  Patient On (Oxygen Delivery Method): nasal cannula, high flow  O2 Flow (L/min): 40  O2 Concentration (%): 65    PHYSICAL EXAM:    GENERAL: NAD  CHEST/LUNG: decreased at bases; seen on hfnc  HEART: S1S2+, Regular rate and rhythm  ABDOMEN: Soft, Nontender, Nondistended; Bowel sounds present  SKIN: warm, dry   NEURO: more drowsy today but responsive     LABS:                        8.7    12.80 )-----------( 222      ( 16 Sep 2024 06:05 )             29.1     09-16    146[H]  |  100  |  48.8[H]  ----------------------------<  127[H]  3.2[L]   |  36.0[H]  |  1.43[H]    Ca    8.1[L]      16 Sep 2024 06:05  Phos  3.5     09-16  Mg     1.9     09-16        Urinalysis Basic - ( 16 Sep 2024 06:05 )    Color: x / Appearance: x / SG: x / pH: x  Gluc: 127 mg/dL / Ketone: x  / Bili: x / Urobili: x   Blood: x / Protein: x / Nitrite: x   Leuk Esterase: x / RBC: x / WBC x   Sq Epi: x / Non Sq Epi: x / Bacteria: x          MEDICATIONS  (STANDING):  albuterol    0.083% 2.5 milliGRAM(s) Nebulizer every 6 hours  aMIOdarone    Tablet 200 milliGRAM(s) Oral daily  buMETAnide Injectable 2 milliGRAM(s) IV Push once  chlorhexidine 2% Cloths 1 Application(s) Topical <User Schedule>  dextrose 5%. 1000 milliLiter(s) (50 mL/Hr) IV Continuous <Continuous>  dextrose 5%. 1000 milliLiter(s) (100 mL/Hr) IV Continuous <Continuous>  dextrose 50% Injectable 25 Gram(s) IV Push once  dextrose 50% Injectable 12.5 Gram(s) IV Push once  dextrose 50% Injectable 25 Gram(s) IV Push once  dextrose Oral Gel 15 Gram(s) Oral once  furosemide   Injectable 40 milliGRAM(s) IV Push daily  glucagon  Injectable 1 milliGRAM(s) IntraMuscular once  heparin   Injectable 5000 Unit(s) SubCutaneous every 12 hours  levothyroxine Injectable 44 MICROGram(s) IV Push at bedtime  methylPREDNISolone sodium succinate Injectable 40 milliGRAM(s) IV Push every 8 hours  metoprolol tartrate Injectable 5 milliGRAM(s) IV Push every 12 hours  nystatin Powder 1 Application(s) Topical two times a day  pantoprazole    Tablet 40 milliGRAM(s) Oral every 12 hours  potassium chloride    Tablet ER 40 milliEquivalent(s) Oral once  potassium chloride  10 mEq/100 mL IVPB 10 milliEquivalent(s) IV Intermittent every 1 hour  rosuvastatin 5 milliGRAM(s) Oral at bedtime  sertraline 50 milliGRAM(s) Oral daily  sodium bicarbonate 650 milliGRAM(s) Oral two times a day  vancomycin  IVPB 500 milliGRAM(s) IV Intermittent every 24 hours    MEDICATIONS  (PRN):  acetaminophen     Tablet .. 650 milliGRAM(s) Oral every 6 hours PRN Temp greater or equal to 38C (100.4F), Mild Pain (1 - 3)  aluminum hydroxide/magnesium hydroxide/simethicone Suspension 30 milliLiter(s) Oral every 4 hours PRN Dyspepsia  melatonin 3 milliGRAM(s) Oral at bedtime PRN Insomnia  ondansetron Injectable 4 milliGRAM(s) IV Push every 8 hours PRN Nausea and/or Vomiting      RADIOLOGY & ADDITIONAL TESTS:   Abdomen soft, non-tender, no guarding.